# Patient Record
Sex: MALE | Race: OTHER | URBAN - METROPOLITAN AREA
[De-identification: names, ages, dates, MRNs, and addresses within clinical notes are randomized per-mention and may not be internally consistent; named-entity substitution may affect disease eponyms.]

---

## 2016-11-05 LAB
CREATININE, EXTERNAL: 1.13
HBA1C MFR BLD HPLC: 8.3 %
LDL-C, EXTERNAL: 45

## 2017-01-20 ENCOUNTER — OFFICE VISIT (OUTPATIENT)
Dept: ENDOCRINOLOGY | Age: 72
End: 2017-01-20

## 2017-01-20 VITALS
BODY MASS INDEX: 34.37 KG/M2 | HEIGHT: 67 IN | HEART RATE: 81 BPM | WEIGHT: 219 LBS | SYSTOLIC BLOOD PRESSURE: 143 MMHG | DIASTOLIC BLOOD PRESSURE: 78 MMHG | TEMPERATURE: 97.3 F | OXYGEN SATURATION: 96 % | RESPIRATION RATE: 16 BRPM

## 2017-01-20 DIAGNOSIS — E11.65 TYPE 2 DIABETES MELLITUS WITH HYPERGLYCEMIA, WITHOUT LONG-TERM CURRENT USE OF INSULIN (HCC): Primary | ICD-10-CM

## 2017-01-20 DIAGNOSIS — E78.2 MIXED HYPERLIPIDEMIA: ICD-10-CM

## 2017-01-20 DIAGNOSIS — I10 ESSENTIAL HYPERTENSION: ICD-10-CM

## 2017-01-20 RX ORDER — VALSARTAN 160 MG/1
TABLET ORAL 2 TIMES DAILY
COMMUNITY
End: 2017-02-22 | Stop reason: SDUPTHER

## 2017-01-20 RX ORDER — GLIPIZIDE 10 MG/1
10 TABLET ORAL DAILY
COMMUNITY
End: 2017-01-20 | Stop reason: SDUPTHER

## 2017-01-20 RX ORDER — SIMVASTATIN 20 MG/1
TABLET, FILM COATED ORAL
COMMUNITY
End: 2018-08-22 | Stop reason: SDUPTHER

## 2017-01-20 RX ORDER — GLUCOSAMINE SULFATE 1500 MG
400 POWDER IN PACKET (EA) ORAL DAILY
COMMUNITY

## 2017-01-20 RX ORDER — METFORMIN HYDROCHLORIDE 1000 MG/1
1000 TABLET ORAL 2 TIMES DAILY WITH MEALS
COMMUNITY
End: 2017-02-22 | Stop reason: SDUPTHER

## 2017-01-20 RX ORDER — COLCHICINE 0.6 MG/1
0.6 TABLET ORAL DAILY
COMMUNITY
End: 2017-02-22 | Stop reason: SDUPTHER

## 2017-01-20 RX ORDER — ASPIRIN 81 MG/1
81 TABLET ORAL DAILY
COMMUNITY

## 2017-01-20 RX ORDER — GLIPIZIDE 10 MG/1
TABLET ORAL
Qty: 60 TAB | Refills: 11 | Status: SHIPPED | OUTPATIENT
Start: 2017-01-20 | End: 2017-02-22 | Stop reason: SDUPTHER

## 2017-01-20 NOTE — PROGRESS NOTES
[facility]               Sarah Qiu MD        [address] 78 [phone] Fax [phone]               Patient Information  Date:1/21/2017  Name : Ankita Rosado 66 y.o.     YOB: 1945         PCP : Bettye Linn. Amanda Gaines MD         Chief Complaint   Patient presents with    Diabetes       History of Present Illness: Ankita Rosado is a 66 y.o. male here for initial visit of  Type 2 Diabetes Mellitus. Type 2 Diabetes was diagnosed in 2006 . End organ effects of diabetes: none. Cardiovascular risk factors: dyslipidemia, diabetes mellitus   Monitoring frequency:1 /day and readings run 160 - 220  Hypoglycemia: no  Eye exam :  yes  Weight trend: stable  Prior visit with dietician: yes -   Current diet: \"healthy\" diet  in general  Current exercise: no regular exercise    Wt Readings from Last 3 Encounters:   01/20/17 219 lb (99.3 kg)       BP Readings from Last 3 Encounters:   01/20/17 143/78           Past Medical History   Diagnosis Date    Diabetes ([facility] [address]     Gout     HTN (hypertension)     Hyperlipemia     Vitamin D deficiency      Current Outpatient Prescriptions   Medication Sig    simvastatin (ZOCOR) 20 mg tablet Take  by mouth nightly.  cholecalciferol (VITAMIN D3) 1,000 unit cap Take  by mouth daily.  metFORMIN (GLUCOPHAGE) 1,000 mg tablet Take 1,000 mg by mouth two (2) times daily (with meals).  VIT A/VIT C/VIT E/ZINC/COPPER (PRESERVISION AREDS PO) Take  by mouth.  valsartan (DIOVAN) 160 mg tablet Take  by mouth two (2) times a day.  colchicine 0.6 mg tablet Take 0.6 mg by mouth daily.  aspirin delayed-release 81 mg tablet Take  by mouth daily.  glipiZIDE (GLUCOTROL) 10 mg tablet Take 10 mg before breakfast and take 10 mg before dinner. No current facility-administered medications for this visit.       Allergies   Allergen Reactions    Lisinopril Swelling         Review of Systems:  - Constitutional Symptoms: no fevers, no chills, no weight loss  - Eyes: no blurry vision no double vision  - Cardiovascular: no chest pain ,no palpitations  - Respiratory: no cough no shortness of breath  - Gastrointestinal: no dysphagia no  abdominal pain  - Musculoskeletal: no joint pains no  weakness  - Integumentary: no rashes  - Neurological: no numbness, tingling, no  headaches  - Psychiatric: no depression no  anxiety  - Endocrine: no heat or cold intolerance    Physical Examination:   Blood pressure 143/78, pulse 81, temperature 97.3 °F (36.3 °C), temperature source Oral, resp. rate 16, height 5' 7\" (1.702 m), weight 219 lb (99.3 kg), SpO2 96 %. Estimated body mass index is 34.3 kg/(m^2) as calculated from the following:    Height as of this encounter: 5' 7\" (1.702 m). -   Weight as of this encounter: 219 lb (99.3 kg). - General: pleasant, no distress, good eye contact  - HEENT: no pallor, no periorbital edema, EOMI  - Neck: supple, no thyromegaly, no nodules  - Cardiovascular: regular, normal rate, normal S1 and S2, no murmurs  - Respiratory: clear to auscultation bilaterally  - Gastrointestinal: soft, nontender, nondistended,  BS +  - Musculoskeletal: no proximal muscle weakness in upper or lower extremities  - Integumentary: no acanthosis nigricans,no edema,   - Neurological: ,alert and oriented  - Psychiatric: normal mood and affect  - Skin: color, texture, turgor normal.       Data Reviewed:     [] Glucose records reviewed. [] See glucose records for details (to be scanned). [] A1C  [] Reviewed labs    Cr nl , A1C 8     Assessment/Plan:     1. Type 2 diabetes mellitus with hyperglycemia, without long-term current use of insulin ([address]    2. Essential hypertension    3. Mixed hyperlipidemia        1.  Type 2 Diabetes Mellitus with nonephropathy,neuropathy,retinopathy  No results found for: HBA1C, HGBE8, XMU6UOOW, BSU6NJCZ, ICC4YVKL   Discussed importance of diet , exercise and compliance with medications  Increase glipizide to 10 mg BID  Risks of hypoglycemia discussed   Advised to check glucose 2 times daily    Diabetic issues reviewed : glycemic goals , written exchange diet given, low carbohydrate diet, weight control , home glucose monitoring emphasized,  hypoglycemia management and long term diabetic complications discussed. FLU annually ,Pneumovax ,aspirin daily,annual eye exam,microalbumin    2. HTN : Continue current therapy     3. Hyperlipidemia : Continue statin. 4.Obesity:Body mass index is 34.3 kg/(m^2). Discussed about the importance of exercise and carbohydrate portion control. Patient Instructions   Glipizide 10 mg twice daily    Follow-up Disposition:  Return in about 3 months (around 4/20/2017). Thank you for allowing me to participate in the care of this patient.     Reba David MD      Patient verbalized understanding

## 2017-01-20 NOTE — PROGRESS NOTES
Eye exam: 8 months ago  Foot exam: within last year  Diabetic for about 10 years    Wt Readings from Last 3 Encounters:   01/20/17 219 lb (99.3 kg)     Temp Readings from Last 3 Encounters:   01/20/17 97.3 °F (36.3 °C) (Oral)     BP Readings from Last 3 Encounters:   01/20/17 143/78     Pulse Readings from Last 3 Encounters:   01/20/17 81

## 2017-01-20 NOTE — MR AVS SNAPSHOT
Visit Information Date & Time Provider Department Dept. Phone Encounter #  
 1/20/2017 11:00 AM Rubén Arellano MD [facility] [phone] [id number] Follow-up Instructions Return in about 3 months (around 4/20/2017). Upcoming Health Maintenance Date Due DTaP/Tdap/Td series (1 - Tdap) 4/3/1965 FOBT Q 1 YEAR AGE 50-75 4/3/1994 ZOSTER VACCINE AGE 60> 4/3/2004 GLAUCOMA SCREENING Q2Y 4/3/2009 Pneumococcal 65+ Low/Medium Risk (1 of 2 - PCV13) 4/3/2009 MEDICARE YEARLY EXAM 4/3/2009 INFLUENZA AGE 9 TO ADULT 8/1/2016 Allergies as of 1/20/2017  Review Complete On: 1/20/2017 By: Rubén Arellano MD  
  
 Severity Noted Reaction Type Reactions Lisinopril  01/20/2017    Swelling Current Immunizations  Never Reviewed No immunizations on file. Not reviewed this visit Vitals BP Pulse Temp Resp Height(growth percentile) Weight(growth percentile) 143/78 (BP 1 Location: Right arm, BP Patient Position: Sitting) 81 97.3 °F (36.3 °C) (Oral) 16 5' 7\" (1.702 m) 219 lb (99.3 kg) SpO2 BMI Smoking Status 96% 34.3 kg/m2 Former Smoker Vitals History BMI and BSA Data Body Mass Index Body Surface Area  
 34.3 kg/m 2 2.17 m 2 Your Updated Medication List  
  
   
This list is accurate as of: 1/20/17 11:51 AM.  Always use your most recent med list.  
  
  
  
  
 aspirin delayed-release 81 mg tablet Take  by mouth daily. colchicine 0.6 mg tablet Take 0.6 mg by mouth daily. glipiZIDE 10 mg tablet Commonly known as:  Selestine Ahuja Take 10 mg by mouth daily. metFORMIN 1,000 mg tablet Commonly known as:  GLUCOPHAGE Take 1,000 mg by mouth two (2) times daily (with meals). PRESERVISION AREDS PO Take  by mouth. simvastatin 20 mg tablet Commonly known as:  ZOCOR Take  by mouth nightly. valsartan 160 mg tablet Commonly known as:  DIOVAN  
 Take  by mouth two (2) times a day. VITAMIN D3 1,000 unit Cap Generic drug:  cholecalciferol Take  by mouth daily. Follow-up Instructions Return in about 3 months (around 4/20/2017). Patient Instructions Glipizide 10 mg twice daily Introducing [facility] & HEALTH SERVICES! [employer] introduces [employer] patient portal. Now you can access parts of your medical record, email your doctor's office, and request medication refills online. 1. In your internet browser, go to [email or web address] 2. Click on the First Time User? Click Here link in the Sign In box. You will see the New Member Sign Up page. 3. Enter your [employer] Access Code exactly as it appears below. You will not need to use this code after youve completed the sign-up process. If you do not sign up before the expiration date, you must request a new code. · [employer] Access Code: [email or web address] Expires: 4/20/2017 11:51 AM 
 
4. Enter the last four digits of your Social Security Number (xxxx) and Date of Birth (mm/dd/yyyy) as indicated and click Submit. You will be taken to the next sign-up page. 5. Create a D[employer]able ID. This will be your [employer]able login ID and cannot be changed, so think of one that is secure and easy to remember. 6. Create a [employer] password. You can change your password at any time. 7. Enter your Password Reset Question and Answer. This can be used at a later time if you forget your password. 8. Enter your e-mail address. You will receive e-mail notification when new information is available in [address] 9. Click Sign Up. You can now view and download portions of your medical record. 10. Click the Download Summary menu link to download a portable copy of your medical information. If you have questions, please visit the Frequently Asked Questions section of the [employer] website. Remember, [employer]able is NOT to be used for urgent needs. For medical emergencies, dial 911. Now available from your iPhone and Android! Please provide this summary of care documentation to your next provider. Your primary care clinician is listed as Ebenezer Gan. If you have any questions after today's visit, please call [phone].

## 2017-01-21 PROBLEM — I10 ESSENTIAL HYPERTENSION: Status: ACTIVE | Noted: 2017-01-21

## 2017-01-21 PROBLEM — E78.2 MIXED HYPERLIPIDEMIA: Status: ACTIVE | Noted: 2017-01-21

## 2017-01-21 PROBLEM — E11.65 TYPE 2 DIABETES MELLITUS WITH HYPERGLYCEMIA, WITHOUT LONG-TERM CURRENT USE OF INSULIN (HCC): Status: ACTIVE | Noted: 2017-01-21

## 2017-02-22 DIAGNOSIS — E11.65 TYPE 2 DIABETES MELLITUS WITH HYPERGLYCEMIA, WITHOUT LONG-TERM CURRENT USE OF INSULIN (HCC): ICD-10-CM

## 2017-02-22 RX ORDER — VALSARTAN 160 MG/1
160 TABLET ORAL 2 TIMES DAILY
Qty: 180 TAB | Refills: 3 | Status: SHIPPED | OUTPATIENT
Start: 2017-02-22

## 2017-02-22 RX ORDER — COLCHICINE 0.6 MG/1
0.6 TABLET ORAL DAILY
Qty: 90 TAB | Refills: 3 | Status: SHIPPED | OUTPATIENT
Start: 2017-02-22

## 2017-02-22 RX ORDER — GLIPIZIDE 10 MG/1
TABLET ORAL
Qty: 180 TAB | Refills: 3 | Status: SHIPPED | OUTPATIENT
Start: 2017-02-22 | End: 2017-07-25 | Stop reason: SDUPTHER

## 2017-02-22 RX ORDER — METFORMIN HYDROCHLORIDE 1000 MG/1
1000 TABLET ORAL 2 TIMES DAILY WITH MEALS
Qty: 180 TAB | Refills: 3 | Status: SHIPPED | OUTPATIENT
Start: 2017-02-22 | End: 2018-10-16 | Stop reason: SDUPTHER

## 2017-04-20 LAB
ALBUMIN SERPL-MCNC: 4.3 G/DL (ref 3.5–4.8)
ALBUMIN/CREAT UR: 15.8 MG/G CREAT (ref 0–30)
ALBUMIN/GLOB SERPL: 1.4 {RATIO} (ref 1.2–2.2)
ALP SERPL-CCNC: 101 IU/L (ref 39–117)
ALT SERPL-CCNC: 10 IU/L (ref 0–44)
AST SERPL-CCNC: 11 IU/L (ref 0–40)
BILIRUB SERPL-MCNC: 0.5 MG/DL (ref 0–1.2)
BUN SERPL-MCNC: 16 MG/DL (ref 8–27)
BUN/CREAT SERPL: 15 (ref 10–24)
CALCIUM SERPL-MCNC: 9.5 MG/DL (ref 8.6–10.2)
CHLORIDE SERPL-SCNC: 96 MMOL/L (ref 96–106)
CHOLEST SERPL-MCNC: 125 MG/DL (ref 100–199)
CO2 SERPL-SCNC: 21 MMOL/L (ref 18–29)
CREAT SERPL-MCNC: 1.04 MG/DL (ref 0.76–1.27)
CREAT UR-MCNC: 124.4 MG/DL
EST. AVERAGE GLUCOSE BLD GHB EST-MCNC: 197 MG/DL
GLOBULIN SER CALC-MCNC: 3 G/DL (ref 1.5–4.5)
GLUCOSE SERPL-MCNC: 197 MG/DL (ref 65–99)
HBA1C MFR BLD: 8.5 % (ref 4.8–5.6)
HDLC SERPL-MCNC: 60 MG/DL
INTERPRETATION, 910389: NORMAL
LDLC SERPL CALC-MCNC: 45 MG/DL (ref 0–99)
Lab: NORMAL
Lab: NORMAL
MICROALBUMIN UR-MCNC: 19.6 UG/ML
POTASSIUM SERPL-SCNC: 4.6 MMOL/L (ref 3.5–5.2)
PROT SERPL-MCNC: 7.3 G/DL (ref 6–8.5)
SODIUM SERPL-SCNC: 138 MMOL/L (ref 134–144)
TRIGL SERPL-MCNC: 102 MG/DL (ref 0–149)
VLDLC SERPL CALC-MCNC: 20 MG/DL (ref 5–40)

## 2017-04-24 ENCOUNTER — OFFICE VISIT (OUTPATIENT)
Dept: ENDOCRINOLOGY | Age: 72
End: 2017-04-24

## 2017-04-24 VITALS
HEART RATE: 76 BPM | TEMPERATURE: 97.2 F | SYSTOLIC BLOOD PRESSURE: 136 MMHG | HEIGHT: 67 IN | WEIGHT: 216 LBS | BODY MASS INDEX: 33.9 KG/M2 | DIASTOLIC BLOOD PRESSURE: 67 MMHG | RESPIRATION RATE: 18 BRPM

## 2017-04-24 DIAGNOSIS — E11.65 TYPE 2 DIABETES MELLITUS WITH HYPERGLYCEMIA, WITHOUT LONG-TERM CURRENT USE OF INSULIN (HCC): Primary | ICD-10-CM

## 2017-04-24 DIAGNOSIS — I10 ESSENTIAL HYPERTENSION: ICD-10-CM

## 2017-04-24 DIAGNOSIS — E78.2 MIXED HYPERLIPIDEMIA: ICD-10-CM

## 2017-04-24 NOTE — PROGRESS NOTES
Desiree York is a 67 y.o. male here for   Chief Complaint   Patient presents with    Diabetes       Functional glucose monitor and record keeping system? - yes  Eye exam within last year? - yes  Foot exam within last year? - yes    Lab Results   Component Value Date/Time    Hemoglobin A1c 8.5 04/19/2017 09:08 AM    Hemoglobin A1c, External 8.3 11/05/2016 12:57 PM       Wt Readings from Last 3 Encounters:   01/20/17 219 lb (99.3 kg)     Temp Readings from Last 3 Encounters:   01/20/17 97.3 °F (36.3 °C) (Oral)     BP Readings from Last 3 Encounters:   01/20/17 143/78     Pulse Readings from Last 3 Encounters:   01/20/17 81

## 2017-04-24 NOTE — MR AVS SNAPSHOT
Visit Information Date & Time Provider Department Dept. Phone Encounter #  
 4/24/2017 11:45 AM Guerda Rodriguez MD [facility] [phone] [id number] Follow-up Instructions Return in about 3 months (around 7/24/2017). Upcoming Health Maintenance Date Due  
 FOOT EXAM Q1 4/3/1954 EYE EXAM RETINAL OR DILATED Q1 4/3/1954 DTaP/Tdap/Td series (1 - Tdap) 4/3/1965 FOBT Q 1 YEAR AGE 50-75 4/3/1994 ZOSTER VACCINE AGE 60> 4/3/2004 GLAUCOMA SCREENING Q2Y 4/3/2009 Pneumococcal 65+ Low/Medium Risk (1 of 2 - PCV13) 4/3/2009 MEDICARE YEARLY EXAM 4/3/2009 INFLUENZA AGE 9 TO ADULT 8/1/2016 HEMOGLOBIN A1C Q6M 10/19/2017 MICROALBUMIN Q1 4/19/2018 LIPID PANEL Q1 4/19/2018 Allergies as of 4/24/2017  Review Complete On: 4/24/2017 By: Guerda Rodriguez MD  
  
 Severity Noted Reaction Type Reactions Lisinopril  01/20/2017    Swelling Current Immunizations  Never Reviewed No immunizations on file. Not reviewed this visit You Were Diagnosed With   
  
 Codes Comments Type 2 diabetes mellitus with hyperglycemia, without long-term current use of insulin (HCC)    -  Primary ICD-10-CM: E11.65 ICD-9-CM: 250.00, 790.29 Mixed hyperlipidemia     ICD-10-CM: E78.2 ICD-9-CM: 272.2 Essential hypertension     ICD-10-CM: I10 
ICD-9-CM: 401.9 Vitals BP Pulse Temp Resp Height(growth percentile) Weight(growth percentile) 136/67 (BP 1 Location: Left arm, BP Patient Position: Sitting) 76 97.2 °F (36.2 °C) (Oral) 18 5' 7\" (1.702 m) 216 lb (98 kg) BMI Smoking Status 33.83 kg/m2 Former Smoker Vitals History BMI and BSA Data Body Mass Index Body Surface Area  
 33.83 kg/m 2 2.15 m 2 Preferred Pharmacy Pharmacy Name Phone [facility] [address] [place] [address] [phone] Your Updated Medication List  
  
   
 This list is accurate as of: 4/24/17  1:02 PM.  Always use your most recent med list.  
  
  
  
  
 aspirin delayed-release 81 mg tablet Take  by mouth daily. colchicine 0.6 mg tablet Take 1 Tab by mouth daily. glipiZIDE 10 mg tablet Commonly known as:  José Fujita Take 10 mg before breakfast and take 10 mg before dinner. metFORMIN 1,000 mg tablet Commonly known as:  GLUCOPHAGE Take 1 Tab by mouth two (2) times daily (with meals). OTHER Take 2 mg by mouth daily. Super Beta Prostate PRESERVISION AREDS PO Take  by mouth. simvastatin 20 mg tablet Commonly known as:  ZOCOR Take  by mouth nightly. valsartan 160 mg tablet Commonly known as:  DIOVAN Take 1 Tab by mouth two (2) times a day. VITAMIN D3 1,000 unit Cap Generic drug:  cholecalciferol Take  by mouth daily. Follow-up Instructions Return in about 3 months (around 7/24/2017). Introducing [facility] & HEALTH SERVICES! Estela Booth introduces [employer] patient portal. Now you can access parts of your medical record, email your doctor's office, and request medication refills online. 1. In your internet browser, go to [email or web address] 2. Click on the First Time User? Click Here link in the Sign In box. You will see the New Member Sign Up page. 3. Enter your [employer] Access Code exactly as it appears below. You will not need to use this code after youve completed the sign-up process. If you do not sign up before the expiration date, you must request a new code. · [employer] Access Code: [email or web address] Expires: 7/23/2017  1:02 PM 
 
4. Enter the last four digits of your Social Security Number (xxxx) and Date of Birth (mm/dd/yyyy) as indicated and click Submit. You will be taken to the next sign-up page. 5. Create a [employer] ID. This will be your [employer] login ID and cannot be changed, so think of one that is secure and easy to remember. 6. Create a [employer] password. You can change your password at any time. 7. Enter your Password Reset Question and Answer. This can be used at a later time if you forget your password. 8. Enter your e-mail address. You will receive e-mail notification when new information is available in [address] 9. Click Sign Up. You can now view and download portions of your medical record. 10. Click the Download Summary menu link to download a portable copy of your medical information. If you have questions, please visit the Frequently Asked Questions section of the [employer] website. Remember, [employer] is NOT to be used for urgent needs. For medical emergencies, dial 911. Now available from your iPhone and Android! Please provide this summary of care documentation to your next provider. Your primary care clinician is listed as Perry Hubbard. If you have any questions after today's visit, please call [phone].

## 2017-04-24 NOTE — PROGRESS NOTES
[facility]               Ash Rodriguez MD        [address] [address] [phone] Fax [phone]               Patient Information  Date:2017  Name : Lindy Seals 67 y.o.     YOB: 1945         PCP : Paulie Wheatley. Aries Puckett MD         Chief Complaint   Patient presents with    Diabetes       History of Present Illness: Lindy Seals is a 67 y.o. male here for fu  of  Type 2 Diabetes Mellitus. Type 2 Diabetes was diagnosed in  . End organ effects of diabetes: none. Cardiovascular risk factors: dyslipidemia, diabetes mellitus   Monitoring frequency:1 /day  Checking glucose at home  Has not noticed significant hypoglycemia     Compliant with medications   BG is higher   Eating late and on high carb diet           Wt Readings from Last 3 Encounters:   17 216 lb (98 kg)   17 219 lb (99.3 kg)       BP Readings from Last 3 Encounters:   17 136/67   17 143/78           Past Medical History:   Diagnosis Date    Diabetes ([facility] [address]     Gout     HTN (hypertension)     Hyperlipemia     Vitamin D deficiency      Current Outpatient Prescriptions   Medication Sig    OTHER Take 2 mg by mouth daily. Super Beta Prostate    metFORMIN (GLUCOPHAGE) 1,000 mg tablet Take 1 Tab by mouth two (2) times daily (with meals).  valsartan (DIOVAN) 160 mg tablet Take 1 Tab by mouth two (2) times a day.  colchicine 0.6 mg tablet Take 1 Tab by mouth daily.  glipiZIDE (GLUCOTROL) 10 mg tablet Take 10 mg before breakfast and take 10 mg before dinner.  simvastatin (ZOCOR) 20 mg tablet Take  by mouth nightly.  cholecalciferol (VITAMIN D3) 1,000 unit cap Take  by mouth daily.  VIT A/VIT C/VIT E/ZINC/COPPER (PRESERVISION AREDS PO) Take  by mouth.  aspirin delayed-release 81 mg tablet Take  by mouth daily. No current facility-administered medications for this visit.       Allergies   Allergen Reactions    Lisinopril Swelling         Review of Systems:  - Constitutional Symptoms: no fevers, no chills, no weight loss  - Eyes: no blurry vision no double vision  - Cardiovascular: no chest pain ,no palpitations  - Respiratory: no cough no shortness of breath  - Gastrointestinal: no dysphagia no  abdominal pain  - Musculoskeletal: no joint pains no  weakness  - Integumentary: no rashes  - Neurological: no numbness, tingling, no  headaches  - Psychiatric: no depression no  anxiety  - Endocrine: no heat or cold intolerance    Physical Examination:   Blood pressure 136/67, pulse 76, temperature 97.2 °F (36.2 °C), temperature source Oral, resp. rate 18, height 5' 7\" (1.702 m), weight 216 lb (98 kg). Estimated body mass index is 33.83 kg/(m^2) as calculated from the following:    Height as of this encounter: 5' 7\" (1.702 m). -   Weight as of this encounter: 216 lb (98 kg). - General: pleasant, no distress, good eye contact  - HEENT: no pallor, no periorbital edema, EOMI  - Neck: supple, no thyromegaly, no nodules  - Cardiovascular: regular, normal rate, normal S1 and S2, no murmurs  - Respiratory: clear to auscultation bilaterally  - Gastrointestinal: soft, nontender, nondistended,  BS +  - Musculoskeletal: no proximal muscle weakness in upper or lower extremities  - Integumentary: no acanthosis nigricans,no edema,   - Neurological: ,alert and oriented  - Psychiatric: normal mood and affect  - Skin: color, texture, turgor normal.       Data Reviewed:     [] Glucose records reviewed. [] See glucose records for details (to be scanned). [] A1C  [] Reviewed labs    Cr nl , A1C 8     Assessment/Plan:     1. Type 2 diabetes mellitus with hyperglycemia, without long-term current use of insulin ([address]    2. Mixed hyperlipidemia    3. Essential hypertension        1.  Type 2 Diabetes Mellitus with nonephropathy,neuropathy,retinopathy  Lab Results   Component Value Date/Time    Hemoglobin A1c 8.5 04/19/2017 09:08 AM    Hemoglobin A1c, External 8.3 11/05/2016 12:57 PM      glipizide to 10 mg BID, Metformin   Januvia  Risks of hypoglycemia discussed   Advised to check glucose 2 times daily    Discussed the importance of checking home glucose regularly and taking all of their scheduled medications in order to have the best possible outcome. Reviewed the complications and importance of diet. 2.  HTN : Continue current therapy     3. Hyperlipidemia : Continue statin. 4.Obesity:Body mass index is 33.83 kg/(m^2). Discussed about the importance of exercise and carbohydrate portion control. There are no Patient Instructions on file for this visit. Follow-up Disposition: Not on File    Thank you for allowing me to participate in the care of this patient.     Reilly Serna MD      Patient verbalized understanding

## 2017-07-25 DIAGNOSIS — E11.65 TYPE 2 DIABETES MELLITUS WITH HYPERGLYCEMIA, WITHOUT LONG-TERM CURRENT USE OF INSULIN (HCC): ICD-10-CM

## 2017-07-25 RX ORDER — GLIPIZIDE 10 MG/1
TABLET ORAL
Qty: 180 TAB | Refills: 3 | Status: SHIPPED | OUTPATIENT
Start: 2017-07-25 | End: 2018-10-08 | Stop reason: SDUPTHER

## 2017-07-26 ENCOUNTER — OFFICE VISIT (OUTPATIENT)
Dept: ENDOCRINOLOGY | Age: 72
End: 2017-07-26

## 2017-07-26 VITALS
RESPIRATION RATE: 16 BRPM | DIASTOLIC BLOOD PRESSURE: 63 MMHG | HEART RATE: 82 BPM | BODY MASS INDEX: 32.3 KG/M2 | HEIGHT: 67 IN | SYSTOLIC BLOOD PRESSURE: 131 MMHG | OXYGEN SATURATION: 96 % | TEMPERATURE: 97.5 F | WEIGHT: 205.8 LBS

## 2017-07-26 DIAGNOSIS — I10 ESSENTIAL HYPERTENSION: ICD-10-CM

## 2017-07-26 DIAGNOSIS — E11.65 TYPE 2 DIABETES MELLITUS WITH HYPERGLYCEMIA, WITHOUT LONG-TERM CURRENT USE OF INSULIN (HCC): Primary | ICD-10-CM

## 2017-07-26 DIAGNOSIS — E78.2 MIXED HYPERLIPIDEMIA: ICD-10-CM

## 2017-07-26 LAB
ALBUMIN SERPL-MCNC: 4.4 G/DL (ref 3.5–4.8)
ALBUMIN/CREAT UR: 17.3 MG/G CREAT (ref 0–30)
ALBUMIN/GLOB SERPL: 1.6 {RATIO} (ref 1.2–2.2)
ALP SERPL-CCNC: 87 IU/L (ref 39–117)
ALT SERPL-CCNC: 9 IU/L (ref 0–44)
AST SERPL-CCNC: 12 IU/L (ref 0–40)
BILIRUB SERPL-MCNC: 0.2 MG/DL (ref 0–1.2)
BUN SERPL-MCNC: 22 MG/DL (ref 8–27)
BUN/CREAT SERPL: 19 (ref 10–24)
CALCIUM SERPL-MCNC: 9.4 MG/DL (ref 8.6–10.2)
CHLORIDE SERPL-SCNC: 98 MMOL/L (ref 96–106)
CHOLEST SERPL-MCNC: 123 MG/DL (ref 100–199)
CO2 SERPL-SCNC: 23 MMOL/L (ref 18–29)
CREAT SERPL-MCNC: 1.14 MG/DL (ref 0.76–1.27)
CREAT UR-MCNC: 256.3 MG/DL
EST. AVERAGE GLUCOSE BLD GHB EST-MCNC: 171 MG/DL
GLOBULIN SER CALC-MCNC: 2.8 G/DL (ref 1.5–4.5)
GLUCOSE SERPL-MCNC: 148 MG/DL (ref 65–99)
HBA1C MFR BLD: 7.6 % (ref 4.8–5.6)
HDLC SERPL-MCNC: 55 MG/DL
INTERPRETATION, 910389: NORMAL
LDLC SERPL CALC-MCNC: 9 MG/DL (ref 0–99)
Lab: NORMAL
MICROALBUMIN UR-MCNC: 44.4 UG/ML
POTASSIUM SERPL-SCNC: 4.6 MMOL/L (ref 3.5–5.2)
PROT SERPL-MCNC: 7.2 G/DL (ref 6–8.5)
SODIUM SERPL-SCNC: 139 MMOL/L (ref 134–144)
TRIGL SERPL-MCNC: 293 MG/DL (ref 0–149)
VLDLC SERPL CALC-MCNC: 59 MG/DL (ref 5–40)

## 2017-07-26 NOTE — MR AVS SNAPSHOT
Visit Information Date & Time Provider Department Dept. Phone Encounter #  
 7/26/2017  1:30 PM Marcus Lopez MD [facility] [phone] [id number] Follow-up Instructions Return in about 6 months (around 1/26/2018). Upcoming Health Maintenance Date Due  
 FOOT EXAM Q1 4/3/1954 EYE EXAM RETINAL OR DILATED Q1 4/3/1954 DTaP/Tdap/Td series (1 - Tdap) 4/3/1965 FOBT Q 1 YEAR AGE 50-75 4/3/1994 ZOSTER VACCINE AGE 60> 2/3/2004 GLAUCOMA SCREENING Q2Y 4/3/2009 Pneumococcal 65+ Low/Medium Risk (1 of 2 - PCV13) 4/3/2009 MEDICARE YEARLY EXAM 4/3/2009 INFLUENZA AGE 9 TO ADULT 8/1/2017 HEMOGLOBIN A1C Q6M 10/19/2017 MICROALBUMIN Q1 4/19/2018 LIPID PANEL Q1 4/19/2018 Allergies as of 7/26/2017  Review Complete On: 7/26/2017 By: Marcus Lopez MD  
  
 Severity Noted Reaction Type Reactions Lisinopril  01/20/2017    Swelling Current Immunizations  Never Reviewed No immunizations on file. Not reviewed this visit You Were Diagnosed With   
  
 Codes Comments Type 2 diabetes mellitus with hyperglycemia, without long-term current use of insulin (HCC)    -  Primary ICD-10-CM: E11.65 ICD-9-CM: 250.00, 790.29 Mixed hyperlipidemia     ICD-10-CM: E78.2 ICD-9-CM: 272.2 Essential hypertension     ICD-10-CM: I10 
ICD-9-CM: 401.9 Vitals BP Pulse Temp Resp Height(growth percentile) Weight(growth percentile) 131/63 (BP 1 Location: Left arm, BP Patient Position: Sitting) 82 97.5 °F (36.4 °C) (Oral) 16 5' 7\" (1.702 m) 205 lb 12.8 oz (93.4 kg) SpO2 BMI Smoking Status 96% 32.23 kg/m2 Former Smoker Vitals History BMI and BSA Data Body Mass Index Body Surface Area  
 32.23 kg/m 2 2.1 m 2 Preferred Pharmacy Pharmacy Name Phone [address] [phone] Your Updated Medication List  
  
   
 This list is accurate as of: 7/26/17  2:00 PM.  Always use your most recent med list.  
  
  
  
  
 aspirin delayed-release 81 mg tablet Take  by mouth daily. colchicine 0.6 mg tablet Take 1 Tab by mouth daily. glipiZIDE 10 mg tablet Commonly known as:  Maxi Erps Take 10 mg before breakfast and take 10 mg before dinner. metFORMIN 1,000 mg tablet Commonly known as:  GLUCOPHAGE Take 1 Tab by mouth two (2) times daily (with meals). OTHER Take 2 mg by mouth daily. Super Beta Prostate PRESERVISION AREDS PO Take  by mouth. simvastatin 20 mg tablet Commonly known as:  ZOCOR Take  by mouth nightly. SITagliptin 100 mg tablet Commonly known as:  Ryann Kendrick Take 1 Tab by mouth every morning. valsartan 160 mg tablet Commonly known as:  DIOVAN Take 1 Tab by mouth two (2) times a day. VITAMIN D3 1,000 unit Cap Generic drug:  cholecalciferol Take  by mouth daily. Follow-up Instructions Return in about 6 months (around 1/26/2018). Introducing \[facility]\"" & HEALTH SERVICES! [facility] introduces [employer] patient portal. Now you can access parts of your medical record, email your doctor's office, and request medication refills online. 1. In your internet browser, go to [email or web address] 2. Click on the First Time User? Click Here link in the Sign In box. You will see the New Member Sign Up page. 3. Enter your [employer] Access Code exactly as it appears below. You will not need to use this code after youve completed the sign-up process. If you do not sign up before the expiration date, you must request a new code. · [employer] Access Code: Kassy Askew Expires: 10/24/2017  2:00 PM 
 
4. Enter the last four digits of your Social Security Number (xxxx) and Date of Birth (mm/dd/yyyy) as indicated and click Submit. You will be taken to the next sign-up page. 5. Create a [employer] ID. This will be your [employer] login ID and cannot be changed, so think of one that is secure and easy to remember. 6. Create a [employer] password. You can change your password at any time. 7. Enter your Password Reset Question and Answer. This can be used at a later time if you forget your password. 8. Enter your e-mail address. You will receive e-mail notification when new information is available in [address] 9. Click Sign Up. You can now view and download portions of your medical record. 10. Click the Download Summary menu link to download a portable copy of your medical information. If you have questions, please visit the Frequently Asked Questions section of the [employer] website. Remember, [employer] is NOT to be used for urgent needs. For medical emergencies, dial 911. Now available from your iPhone and Android! Please provide this summary of care documentation to your next provider. Your primary care clinician is listed as Annetta Painter. If you have any questions after today's visit, please call [phone].

## 2017-07-26 NOTE — PROGRESS NOTES
[facility]               Bailey Valencia MD        [address] [address] SD:[phone] Fax [phone]               Patient Information  Date:7/27/2017  Name : Acacia Fernandez 67 y.o.     YOB: 1945         PCP : Merna Mejia MD         Chief Complaint   Patient presents with    Diabetes       History of Present Illness: Acacia Fernandez is a 67 y.o. male here for fu  of  Type 2 Diabetes Mellitus. Type 2 Diabetes was diagnosed in 2006 . End organ effects of diabetes: none. Cardiovascular risk factors: dyslipidemia, diabetes mellitus   Monitoring frequency:1 /day  Checking glucose at home  Has not noticed significant hypoglycemia       Lost weight - 9 lbs     Doing well     Compliant with medications          Wt Readings from Last 3 Encounters:   07/26/17 205 lb 12.8 oz (93.4 kg)   04/24/17 216 lb (98 kg)   01/20/17 219 lb (99.3 kg)       BP Readings from Last 3 Encounters:   07/26/17 131/63   04/24/17 136/67   01/20/17 143/78           Past Medical History:   Diagnosis Date    Diabetes ([facility] [address] [facility]     Gout     HTN (hypertension)     Hyperlipemia     Vitamin D deficiency      Current Outpatient Prescriptions   Medication Sig    glipiZIDE (GLUCOTROL) 10 mg tablet Take 10 mg before breakfast and take 10 mg before dinner.  OTHER Take 2 mg by mouth daily. Super Beta Prostate    SITagliptin (JANUVIA) 100 mg tablet Take 1 Tab by mouth every morning.  metFORMIN (GLUCOPHAGE) 1,000 mg tablet Take 1 Tab by mouth two (2) times daily (with meals).  valsartan (DIOVAN) 160 mg tablet Take 1 Tab by mouth two (2) times a day.  colchicine 0.6 mg tablet Take 1 Tab by mouth daily.  simvastatin (ZOCOR) 20 mg tablet Take  by mouth nightly.  cholecalciferol (VITAMIN D3) 1,000 unit cap Take  by mouth daily.  VIT A/VIT C/VIT E/ZINC/COPPER (PRESERVISION AREDS PO) Take  by mouth.     aspirin delayed-release 81 mg tablet Take  by mouth daily. No current facility-administered medications for this visit. Allergies   Allergen Reactions    Lisinopril Swelling         Review of Systems:  - Constitutional Symptoms: no fevers, no chills, no weight loss  - Eyes: no blurry vision no double vision  - Cardiovascular: no chest pain ,no palpitations  - Respiratory: no cough no shortness of breath  - Gastrointestinal: no dysphagia no  abdominal pain  - Musculoskeletal: no joint pains no  weakness  - Integumentary: no rashes  - Neurological: no numbness, tingling, no  headaches  - Psychiatric: no depression no  anxiety  - Endocrine: no heat or cold intolerance    Physical Examination:   Blood pressure 131/63, pulse 82, temperature 97.5 °F (36.4 °C), temperature source Oral, resp. rate 16, height 5' 7\" (1.702 m), weight 205 lb 12.8 oz (93.4 kg), SpO2 96 %. Estimated body mass index is 32.23 kg/(m^2) as calculated from the following:    Height as of this encounter: 5' 7\" (1.702 m). -   Weight as of this encounter: 205 lb 12.8 oz (93.4 kg). - General: pleasant, no distress, good eye contact  - HEENT: no pallor, no periorbital edema, EOMI  - Neck: supple, no thyromegaly, no nodules  - Cardiovascular: regular, normal rate, normal S1 and S2, no murmurs  - Respiratory: clear to auscultation bilaterally  - Gastrointestinal: soft, nontender, nondistended,  BS +  - Musculoskeletal: no proximal muscle weakness in upper or lower extremities  - Integumentary: no acanthosis nigricans,no edema,   - Neurological: ,alert and oriented  - Psychiatric: normal mood and affect  - Skin: color, texture, turgor normal.       Data Reviewed:     [] Glucose records reviewed. [] See glucose records for details (to be scanned). [] A1C  [] Reviewed labs    Cr nl , A1C 8     Assessment/Plan:     1. Type 2 diabetes mellitus with hyperglycemia, without long-term current use of insulin ([address]    2. Mixed hyperlipidemia    3. Essential hypertension        1.  Type 2 Diabetes Mellitus with nonephropathy,neuropathy,retinopathy  Lab Results   Component Value Date/Time    Hemoglobin A1c 7.6 07/25/2017 01:44 PM    Hemoglobin A1c, External 8.3 11/05/2016 12:57 PM      glipizide to 10 mg BID, Metformin   Januvia  Risks of hypoglycemia discussed   Advised to check glucose 2 times daily    Discussed the importance of checking home glucose regularly and taking all of their scheduled medications in order to have the best possible outcome. Reviewed the complications and importance of diet. 2.  HTN : Continue current therapy     3. Hyperlipidemia : Continue statin. 4.Obesity:Body mass index is 32.23 kg/(m^2). Discussed about the importance of exercise and carbohydrate portion control. There are no Patient Instructions on file for this visit. Follow-up Disposition:  Return in about 6 months (around 1/26/2018). Thank you for allowing me to participate in the care of this patient.     Jeanine Blizzard, MD      Patient verbalized understanding

## 2017-10-03 LAB — CREATININE, EXTERNAL: 1.06

## 2018-01-30 ENCOUNTER — OFFICE VISIT (OUTPATIENT)
Dept: ENDOCRINOLOGY | Age: 73
End: 2018-01-30

## 2018-01-30 VITALS
WEIGHT: 213.2 LBS | SYSTOLIC BLOOD PRESSURE: 147 MMHG | RESPIRATION RATE: 16 BRPM | BODY MASS INDEX: 33.46 KG/M2 | OXYGEN SATURATION: 97 % | DIASTOLIC BLOOD PRESSURE: 69 MMHG | HEIGHT: 67 IN | TEMPERATURE: 96.9 F | HEART RATE: 71 BPM

## 2018-01-30 DIAGNOSIS — E78.2 MIXED HYPERLIPIDEMIA: ICD-10-CM

## 2018-01-30 DIAGNOSIS — I10 ESSENTIAL HYPERTENSION: ICD-10-CM

## 2018-01-30 DIAGNOSIS — E11.65 TYPE 2 DIABETES MELLITUS WITH HYPERGLYCEMIA, WITHOUT LONG-TERM CURRENT USE OF INSULIN (HCC): Primary | ICD-10-CM

## 2018-01-30 LAB
GLUCOSE POC: 217 MG/DL
HBA1C MFR BLD HPLC: 8.5 %

## 2018-01-30 NOTE — PROGRESS NOTES
Laura Farmer is a 67 y.o. male here for   Chief Complaint   Patient presents with    Diabetes       Functional glucose monitor and record keeping system? - yes  Eye exam within last year? - [address], [place]     1. Have you been to the ER, urgent care clinic since your last visit? Hospitalized since your last visit? -no    2. Have you seen or consulted any other health care providers outside of the [address] [place] [address] since your last visit? Include any pap smears or colon screening. -PCP      Lab Results   Component Value Date/Time    Hemoglobin A1c 7.6 07/25/2017 01:44 PM    Hemoglobin A1c, External 8.3 11/05/2016 12:57 PM       Wt Readings from Last 3 Encounters:   07/26/17 205 lb 12.8 oz (93.4 kg)   04/24/17 216 lb (98 kg)   01/20/17 219 lb (99.3 kg)     Temp Readings from Last 3 Encounters:   07/26/17 97.5 °F (36.4 °C) (Oral)   04/24/17 97.2 °F (36.2 °C) (Oral)   01/20/17 97.3 °F (36.3 °C) (Oral)     BP Readings from Last 3 Encounters:   07/26/17 131/63   04/24/17 136/67   01/20/17 143/78     Pulse Readings from Last 3 Encounters:   07/26/17 82   04/24/17 76   01/20/17 81

## 2018-01-30 NOTE — MR AVS SNAPSHOT
[address] [place] [address] E [facility] [address] 
[phone] Patient: Moon Luz MRN: [id number] SDL:[birth year] Visit Information Date & Time Provider Department Dept. Phone Encounter #  
 1/30/2018  1:30 PM Liz Orr MD [facility] [phone] [id number] Follow-up Instructions Return in about 5 months (around 6/30/2018). Upcoming Health Maintenance Date Due  
 FOOT EXAM Q1 4/3/1954 EYE EXAM RETINAL OR DILATED Q1 4/3/1954 DTaP/Tdap/Td series (1 - Tdap) 4/3/1965 FOBT Q 1 YEAR AGE 50-75 4/3/1994 ZOSTER VACCINE AGE 60> 2/3/2004 GLAUCOMA SCREENING Q2Y 4/3/2009 Pneumococcal 65+ Low/Medium Risk (1 of 2 - PCV13) 4/3/2009 MEDICARE YEARLY EXAM 4/3/2009 Influenza Age 5 to Adult 8/1/2017 HEMOGLOBIN A1C Q6M 1/25/2018 MICROALBUMIN Q1 7/25/2018 LIPID PANEL Q1 7/25/2018 Allergies as of 1/30/2018  Review Complete On: 1/30/2018 By: Liz Orr MD  
  
 Severity Noted Reaction Type Reactions Lisinopril  01/20/2017    Swelling Current Immunizations  Never Reviewed No immunizations on file. Not reviewed this visit You Were Diagnosed With   
  
 Codes Comments Type 2 diabetes mellitus with hyperglycemia, without long-term current use of insulin (HCC)    -  Primary ICD-10-CM: E11.65 ICD-9-CM: 250.00, 790.29 Mixed hyperlipidemia     ICD-10-CM: E78.2 ICD-9-CM: 272.2 Essential hypertension     ICD-10-CM: I10 
ICD-9-CM: 401.9 Vitals BP Pulse Temp Resp Height(growth percentile) Weight(growth percentile) 147/69 (BP 1 Location: Left arm, BP Patient Position: Sitting) 71 96.9 °F (36.1 °C) (Oral) 16 5' 7\" (1.702 m) 213 lb 3.2 oz (96.7 kg) SpO2 BMI Smoking Status 97% 33.39 kg/m2 Former Smoker Vitals History BMI and BSA Data Body Mass Index Body Surface Area  
 33.39 kg/m 2 2.14 m 2 [facility] [address] [address] [phone] Your Updated Medication List  
  
   
This list is accurate as of: 1/30/18  2:15 PM.  Always use your most recent med list.  
  
  
  
  
 aspirin delayed-release 81 mg tablet Take 81 mg by mouth daily. colchicine 0.6 mg tablet Take 1 Tab by mouth daily. glipiZIDE 10 mg tablet Commonly known as:  Lawrence Formosa Take 10 mg before breakfast and take 10 mg before dinner. metFORMIN 1,000 mg tablet Commonly known as:  GLUCOPHAGE Take 1 Tab by mouth two (2) times daily (with meals). OTHER Take 2 mg by mouth daily. Super Beta Prostate PRESERVISION AREDS PO Take  by mouth. simvastatin 20 mg tablet Commonly known as:  ZOCOR Take  by mouth nightly. SITagliptin 100 mg tablet Commonly known as:  Ceil [place] Take 1 Tab by mouth every morning. valsartan 160 mg tablet Commonly known as:  DIOVAN Take 1 Tab by mouth two (2) times a day. VITAMIN D3 1,000 unit Cap Generic drug:  cholecalciferol Take 400 Units by mouth daily. We Performed the Following AMB POC GLUCOSE, QUANTITATIVE, BLOOD [47520 CPT(R)] AMB POC HEMOGLOBIN A1C [79754 CPT(R)] MICROALBUMIN, UR, RAND W/ MICROALBUMIN/CREA RATIO J2225623 CPT(R)] Follow-up Instructions Return in about 5 months (around 6/30/2018). Introducing [facility]! Jeannine Cross introduces [employer] patient portal. Now you can access parts of your medical record, email your doctor's office, and request medication refills online. 1. In your internet browser, go to [email or web address] 2. Click on the First Time User? Click Here link in the Sign In box. You will see the New Member Sign Up page. 3. Enter your [employer] Access Code exactly as it appears below. You will not need to use this code after youve completed the sign-up process.  If you do not sign up before the expiration date, you must request a new code. · [employer] Access Code: [email or web address] Expires: 4/30/2018  2:15 PM 
 
4. Enter the last four digits of your Social Security Number (xxxx) and Date of Birth (mm/dd/yyyy) as indicated and click Submit. You will be taken to the next sign-up page. 5. Create a [employer]urb ID. This will be your [employer] login ID and cannot be changed, so think of one that is secure and easy to remember. 6. Create a [employer] password. You can change your password at any time. 7. Enter your Password Reset Question and Answer. This can be used at a later time if you forget your password. 8. Enter your e-mail address. You will receive e-mail notification when new information is available in [address] 9. Click Sign Up. You can now view and download portions of your medical record. 10. Click the Download Summary menu link to download a portable copy of your medical information. If you have questions, please visit the Frequently Asked Questions section of the [employer] website. Remember, [employer] is NOT to be used for urgent needs. For medical emergencies, dial 911. Now available from your iPhone and Android! Please provide this summary of care documentation to your next provider. Your primary care clinician is listed as Scar Hong. If you have any questions after today's visit, please call [phone].

## 2018-01-30 NOTE — PROGRESS NOTES
[facility]               Kalpana Choi MD        [address] [address] HP:[phone] Fax [phone]               Patient Information  Date:1/30/2018  Name : Carloz Kohli 67 y.o.     YOB: 1945         PCP : Serafin Camp MD         Chief Complaint   Patient presents with    Diabetes       History of Present Illness: Carloz Kohli is a 67 y.o. male here for fu  of  Type 2 Diabetes Mellitus. Type 2 Diabetes was diagnosed in 2006 . End organ effects of diabetes: none. Cardiovascular risk factors: dyslipidemia, diabetes mellitus   Monitoring frequency:1 /day  Checking glucose at home  Has not noticed significant hypoglycemia        Gained weight during holidays '  Not on JAnuvia   BG is high       Compliant with medications          Wt Readings from Last 3 Encounters:   01/30/18 213 lb 3.2 oz (96.7 kg)   07/26/17 205 lb 12.8 oz (93.4 kg)   04/24/17 216 lb (98 kg)       BP Readings from Last 3 Encounters:   01/30/18 147/69   07/26/17 131/63   04/24/17 136/67           Past Medical History:   Diagnosis Date    Diabetes ([facility] [address]     Gout     HTN (hypertension)     Hyperlipemia     Vitamin D deficiency      Current Outpatient Prescriptions   Medication Sig    glipiZIDE (GLUCOTROL) 10 mg tablet Take 10 mg before breakfast and take 10 mg before dinner.  metFORMIN (GLUCOPHAGE) 1,000 mg tablet Take 1 Tab by mouth two (2) times daily (with meals).  valsartan (DIOVAN) 160 mg tablet Take 1 Tab by mouth two (2) times a day.  colchicine 0.6 mg tablet Take 1 Tab by mouth daily. (Patient taking differently: Take 0.6 mg by mouth as needed.)    simvastatin (ZOCOR) 20 mg tablet Take  by mouth nightly.  cholecalciferol (VITAMIN D3) 1,000 unit cap Take 400 Units by mouth daily.  VIT A/VIT C/VIT E/ZINC/COPPER (PRESERVISION AREDS PO) Take  by mouth.  aspirin delayed-release 81 mg tablet Take 81 mg by mouth daily.     OTHER Take 2 mg by mouth daily. Super Beta Prostate    SITagliptin (JANUVIA) 100 mg tablet Take 1 Tab by mouth every morning. No current facility-administered medications for this visit. Allergies   Allergen Reactions    Lisinopril Swelling         Review of Systems:  - Constitutional Symptoms: no fevers, no chills, no weight loss  - Eyes: no blurry vision no double vision  - Cardiovascular: no chest pain ,no palpitations  - Respiratory: no cough no shortness of breath  - Gastrointestinal: no dysphagia no  abdominal pain  - Musculoskeletal: no joint pains no  weakness  - Integumentary: no rashes  - Neurological: no numbness, tingling, no  headaches  - Psychiatric: no depression no  anxiety  - Endocrine: no heat or cold intolerance    Physical Examination:   Blood pressure 147/69, pulse 71, temperature 96.9 °F (36.1 °C), temperature source Oral, resp. rate 16, height 5' 7\" (1.702 m), weight 213 lb 3.2 oz (96.7 kg), SpO2 97 %. Estimated body mass index is 33.39 kg/(m^2) as calculated from the following:    Height as of this encounter: 5' 7\" (1.702 m). -   Weight as of this encounter: 213 lb 3.2 oz (96.7 kg).   - General: pleasant, no distress, good eye contact  - HEENT: no pallor, no periorbital edema, EOMI  - Neck: supple, no thyromegaly, no nodules  - Cardiovascular: regular, normal rate, normal S1 and S2, no murmurs  - Respiratory: clear to auscultation bilaterally  - Gastrointestinal: soft, nontender, nondistended,  BS +  - Musculoskeletal: no proximal muscle weakness in upper or lower extremities  - Integumentary: no acanthosis nigricans,no edema,   - Neurological: ,alert and oriented  - Psychiatric: normal mood and affect  - Skin: color, texture, turgor normal.     Diabetic foot exam: Jan 2018     Left:     Vibratory sensation normal    Filament test normal sensation with micro filament   Pulse DP: 1+    Deformities: flat feet   Right:    Vibratory sensation normal   Filament test normal sensation with micro filament   Pulse DP: 1+   Deformities: bunion , flat feet          right bunion , flat feet   Data Reviewed:     [] Glucose records reviewed. [] See glucose records for details (to be scanned). [] A1C  [] Reviewed labs      Assessment/Plan:     1. Type 2 diabetes mellitus with hyperglycemia, without long-term current use of insulin ([facility] [address] [facility]    2. Mixed hyperlipidemia    3. Essential hypertension        1. Type 2 Diabetes Mellitus   Lab Results   Component Value Date/Time    Hemoglobin A1c 7.6 07/25/2017 01:44 PM    Hemoglobin A1c (POC) 8.5 01/30/2018 01:51 PM    Hemoglobin A1c, External 8.3 11/05/2016 12:57 PM   uncontrolled    glipizide to 10 mg BID, Metformin   Januvia - has not started yet   Wants TLC  Risks of hypoglycemia discussed   Advised to check glucose 2 times daily    Discussed the importance of checking home glucose regularly and taking all of their scheduled medications in order to have the best possible outcome. Reviewed the complications and importance of diet. 2.  HTN : Continue current therapy     3. Hyperlipidemia : Continue statin. 4.Obesity:Body mass index is 33.39 kg/(m^2). Discussed about the importance of exercise and carbohydrate portion control. There are no Patient Instructions on file for this visit. Follow-up Disposition:  Return in about 5 months (around 6/30/2018). Thank you for allowing me to participate in the care of this patient.     Arben Velazquez MD      Patient verbalized understanding

## 2018-01-31 LAB
ALBUMIN/CREAT UR: 12.1 MG/G CREAT (ref 0–30)
CREAT UR-MCNC: 193.5 MG/DL
MICROALBUMIN UR-MCNC: 23.4 UG/ML

## 2018-06-29 ENCOUNTER — OFFICE VISIT (OUTPATIENT)
Dept: ENDOCRINOLOGY | Age: 73
End: 2018-06-29

## 2018-06-29 VITALS
OXYGEN SATURATION: 97 % | RESPIRATION RATE: 16 BRPM | HEIGHT: 67 IN | TEMPERATURE: 97.1 F | BODY MASS INDEX: 32.96 KG/M2 | WEIGHT: 210 LBS | HEART RATE: 64 BPM | SYSTOLIC BLOOD PRESSURE: 146 MMHG | DIASTOLIC BLOOD PRESSURE: 80 MMHG

## 2018-06-29 DIAGNOSIS — E78.2 MIXED HYPERLIPIDEMIA: ICD-10-CM

## 2018-06-29 DIAGNOSIS — E11.65 TYPE 2 DIABETES MELLITUS WITH HYPERGLYCEMIA, WITHOUT LONG-TERM CURRENT USE OF INSULIN (HCC): ICD-10-CM

## 2018-06-29 DIAGNOSIS — E11.65 TYPE 2 DIABETES MELLITUS WITH HYPERGLYCEMIA, WITHOUT LONG-TERM CURRENT USE OF INSULIN (HCC): Primary | ICD-10-CM

## 2018-06-29 DIAGNOSIS — I10 ESSENTIAL HYPERTENSION: ICD-10-CM

## 2018-06-29 LAB
GLUCOSE POC: 168 MG/DL
HBA1C MFR BLD HPLC: 8.6 %

## 2018-06-29 NOTE — PROGRESS NOTES
Perez Waddell is a 75 y.o. male here for   Chief Complaint   Patient presents with    Diabetes       Functional glucose monitor and record keeping system? - yes  Eye exam within last year? - [address] exam within last year? - within last year    1. Have you been to the ER, urgent care clinic since your last visit? Hospitalized since your last visit? - no    2. Have you seen or consulted any other health care providers outside of the [facility] since your last visit?   Include any pap smears or colon screening.- no, PCP retired

## 2018-06-29 NOTE — PROGRESS NOTES
[facility]               Sarah Hein MD        [address]:[phone] Fax [phone]               Patient Information  Date:6/29/2018  Name : Kimani Fink 75 y.o.     YOB: 1945         PCP : Gerber Quarles MD         Chief Complaint   Patient presents with    Diabetes       History of Present Illness: Kimani Fink is a 75 y.o. male here for fu  of  Type 2 Diabetes Mellitus. Type 2 Diabetes was diagnosed in 2006 . End organ effects of diabetes: none. Cardiovascular risk factors: dyslipidemia, diabetes mellitus   Monitoring frequency:1 /day  Checking glucose at home  Has not noticed significant hypoglycemia   No improvement in the glycemic control, diet could be better  He is trying to walk  Fastings are still high  He has not started Januvia      Compliant with medications          Wt Readings from Last 3 Encounters:   06/29/18 210 lb (95.3 kg)   01/30/18 213 lb 3.2 oz (96.7 kg)   07/26/17 205 lb 12.8 oz (93.4 kg)       BP Readings from Last 3 Encounters:   06/29/18 154/74   01/30/18 147/69   07/26/17 131/63           Past Medical History:   Diagnosis Date    [facility]     Gout     HTN (hypertension)     Hyperlipemia     Vitamin D deficiency      Current Outpatient Prescriptions   Medication Sig    glipiZIDE (GLUCOTROL) 10 mg tablet Take 10 mg before breakfast and take 10 mg before dinner.  metFORMIN (GLUCOPHAGE) 1,000 mg tablet Take 1 Tab by mouth two (2) times daily (with meals).  valsartan (DIOVAN) 160 mg tablet Take 1 Tab by mouth two (2) times a day.  colchicine 0.6 mg tablet Take 1 Tab by mouth daily. (Patient taking differently: Take 0.6 mg by mouth as needed.)    simvastatin (ZOCOR) 20 mg tablet Take  by mouth nightly.  cholecalciferol (VITAMIN D3) 1,000 unit cap Take 400 Units by mouth daily.  VIT A/VIT C/VIT E/ZINC/COPPER (PRESERVISION AREDS PO) Take  by mouth.     aspirin delayed-release 81 mg tablet Take 81 mg by mouth daily.  OTHER Take 2 mg by mouth daily. Super Beta Prostate    SITagliptin (JANUVIA) 100 mg tablet Take 1 Tab by mouth every morning. No current facility-administered medications for this visit. Allergies   Allergen Reactions    Lisinopril Swelling         Review of Systems:  - ons  - Respiratory: no cough no shortness of breath  - Gastrointestinal: no dysphagia no  abdominal pain  - Musculoskeletal: no joint pains no  weakness  - Integumentary: no rashes  - Neurological: no numbness, tingling, no  headaches  - Psychiatric: no depression no  anxiety  - Endocrine: no heat or cold intolerance    Physical Examination:   Blood pressure 154/74, pulse 64, temperature 97.1 °F (36.2 °C), temperature source Oral, resp. rate 16, height 5' 7\" (1.702 m), weight 210 lb (95.3 kg), SpO2 97 %. Estimated body mass index is 32.89 kg/(m^2) as calculated from the following:    Height as of this encounter: 5' 7\" (1.702 m). -   Weight as of this encounter: 210 lb (95.3 kg).   - General: pleasant, no distress, good eye contact  - HEENT: no pallor, no periorbital edema, EOMI  - Neck: supple, no thyromegaly, no nodules  - Cardiovascular: regular, normal rate, normal S1 and S2, no murmurs  - Respiratory: clear to auscultation bilaterally  - Gastrointestinal: soft, nontender, nondistended,  BS +  - Musculoskeletal: no proximal muscle weakness in upper or lower extremities  - Integumentary: no acanthosis nigricans,no edema,   - Neurological: ,alert and oriented  - Psychiatric: normal mood and affect  - Skin: color, texture, turgor normal.     Diabetic foot exam: Jan 2018     Left:     Vibratory sensation normal    Filament test normal sensation with micro filament   Pulse DP: 1+    Deformities: flat feet   Right:    Vibratory sensation normal   Filament test normal sensation with micro filament   Pulse DP: 1+   Deformities: bunion , flat feet          right bunion , flat feet Data Reviewed:     [] Glucose records reviewed. [] See glucose records for details (to be scanned). [] A1C  [] Reviewed labs      Assessment/Plan:     1. Type 2 diabetes mellitus with hyperglycemia, without long-term current use of insulin ([facility]    2. Mixed hyperlipidemia    3. Essential hypertension        1. Type 2 Diabetes Mellitus   Lab Results   Component Value Date/Time    Hemoglobin A1c 7.6 (H) 07/25/2017 01:44 PM    Hemoglobin A1c (POC) 8.6 06/29/2018 01:30 PM    Hemoglobin A1c, External 8.3 11/05/2016 12:57 PM   uncontrolled    glipizide to 10 mg BID, Metformin   Januvia -resume  Calorie restriction  Risks of hypoglycemia discussed   Advised to check glucose 2 times daily    Discussed the importance of checking home glucose regularly and taking all of their scheduled medications in order to have the best possible outcome. Reviewed the complications and importance of diet. 2.  HTN : Continue current therapy     3. Hyperlipidemia : Continue statin. 4.Obesity:Body mass index is 32.89 kg/(m^2). Discussed about the importance of exercise and carbohydrate portion control. There are no Patient Instructions on file for this visit. Follow-up Disposition: Not on File    Thank you for allowing me to participate in the care of this patient.     To Soler MD      Patient verbalized understanding

## 2018-06-29 NOTE — MR AVS SNAPSHOT
[address] [place] [address] 
[phone] Patient: Babatunde Horne MRN: [id number] FCQ:[id number] Visit Information Date & Time Provider Department Dept. Phone Encounter #  
 6/29/2018  1:30 PM Jama oRdas MD [facility] [phone] [id number] Follow-up Instructions Return in about 5 months (around 11/29/2018). Upcoming Health Maintenance Date Due  
 EYE EXAM RETINAL OR DILATED Q1 4/3/1954 DTaP/Tdap/Td series (1 - Tdap) 4/3/1965 FOBT Q 1 YEAR AGE 50-75 4/3/1994 ZOSTER VACCINE AGE 60> 2/3/2004 GLAUCOMA SCREENING Q2Y 4/3/2009 Pneumococcal 65+ Low/Medium Risk (1 of 2 - PCV13) 4/3/2009 LIPID PANEL Q1 7/25/2018 HEMOGLOBIN A1C Q6M 7/30/2018 Influenza Age 5 to Adult 8/1/2018 FOOT EXAM Q1 1/30/2019 MICROALBUMIN Q1 1/30/2019 Allergies as of 6/29/2018  Review Complete On: 6/29/2018 By: Jama Rodas MD  
  
 Severity Noted Reaction Type Reactions Lisinopril  01/20/2017    Swelling Current Immunizations  Never Reviewed No immunizations on file. Not reviewed this visit You Were Diagnosed With   
  
 Codes Comments Type 2 diabetes mellitus with hyperglycemia, without long-term current use of insulin (HCC)    -  Primary ICD-10-CM: E11.65 ICD-9-CM: 250.00, 790.29 Mixed hyperlipidemia     ICD-10-CM: E78.2 ICD-9-CM: 272.2 Essential hypertension     ICD-10-CM: I10 
ICD-9-CM: 401.9 Vitals BP Pulse Temp Resp Height(growth percentile) Weight(growth percentile) 146/80 64 97.1 °F (36.2 °C) (Oral) 16 5' 7\" (1.702 m) 210 lb (95.3 kg) SpO2 BMI Smoking Status 97% 32.89 kg/m2 Former Smoker Vitals History BMI and BSA Data Body Mass Index Body Surface Area  
 32.89 kg/m 2 2.12 m 2 Preferred Pharmacy Pharmacy Name Phone  [address], [address] [phone] Your Updated Medication List  
  
   
This list is accurate as of 6/29/18  2:15 PM.  Always use your most recent med list.  
  
  
  
  
 aspirin delayed-release 81 mg tablet Take 81 mg by mouth daily. colchicine 0.6 mg tablet Take 1 Tab by mouth daily. glipiZIDE 10 mg tablet Commonly known as:  Jenni Huger Take 10 mg before breakfast and take 10 mg before dinner. metFORMIN 1,000 mg tablet Commonly known as:  GLUCOPHAGE Take 1 Tab by mouth two (2) times daily (with meals). OTHER Take 2 mg by mouth daily. Super Beta Prostate PRESERVISION AREDS PO Take  by mouth. simvastatin 20 mg tablet Commonly known as:  ZOCOR Take  by mouth nightly. SITagliptin 100 mg tablet Commonly known as:  Obey Cobble Take 1 Tab by mouth every morning. valsartan 160 mg tablet Commonly known as:  DIOVAN Take 1 Tab by mouth two (2) times a day. VITAMIN D3 1,000 unit Cap Generic drug:  cholecalciferol Take 400 Units by mouth daily. We Performed the Following AMB POC GLUCOSE BLOOD, BY GLUCOSE MONITORING DEVICE [49688 CPT(R)] AMB POC HEMOGLOBIN A1C [07823 CPT(R)] Follow-up Instructions Return in about 5 months (around 11/29/2018). Introducing [facility]! Irais De La Garza introduces Sightly patient portal. Now you can access parts of your medical record, email your doctor's office, and request medication refills online. 1. In your internet browser, go to [email or web address] 2. Click on the First Time User? Click Here link in the Sign In box. You will see the New Member Sign Up page. 3. Enter your Sightly Access Code exactly as it appears below. You will not need to use this code after youve completed the sign-up process. If you do not sign up before the expiration date, you must request a new code. · Sightly Access Code: [email or web address] Expires: 9/27/2018 12:54 PM 
 
 4. Enter the last four digits of your Social Security Number (xxxx) and Date of Birth (mm/dd/yyyy) as indicated and click Submit. You will be taken to the next sign-up page. 5. Create a Farmol ID. This will be your Farmol login ID and cannot be changed, so think of one that is secure and easy to remember. 6. Create a Farmol password. You can change your password at any time. 7. Enter your Password Reset Question and Answer. This can be used at a later time if you forget your password. 8. Enter your e-mail address. You will receive e-mail notification when new information is available in [address] 9. Click Sign Up. You can now view and download portions of your medical record. 10. Click the Download Summary menu link to download a portable copy of your medical information. If you have questions, please visit the Frequently Asked Questions section of the Farmol website. Remember, Farmol is NOT to be used for urgent needs. For medical emergencies, dial 911. Now available from your iPhone and Android! Please provide this summary of care documentation to your next provider. Your primary care clinician is listed as Isreal Soto. If you have any questions after today's visit, please call [phone].

## 2018-07-12 ENCOUNTER — OFFICE VISIT (OUTPATIENT)
Dept: FAMILY MEDICINE CLINIC | Age: 73
End: 2018-07-12

## 2018-07-12 VITALS
DIASTOLIC BLOOD PRESSURE: 65 MMHG | TEMPERATURE: 98.3 F | SYSTOLIC BLOOD PRESSURE: 130 MMHG | RESPIRATION RATE: 20 BRPM | BODY MASS INDEX: 32.49 KG/M2 | HEART RATE: 65 BPM | OXYGEN SATURATION: 98 % | WEIGHT: 207 LBS | HEIGHT: 67 IN

## 2018-07-12 DIAGNOSIS — Z76.89 ENCOUNTER TO ESTABLISH CARE: Primary | ICD-10-CM

## 2018-07-12 DIAGNOSIS — E78.2 MIXED HYPERLIPIDEMIA: ICD-10-CM

## 2018-07-12 DIAGNOSIS — E11.8 TYPE 2 DIABETES MELLITUS WITH COMPLICATION, WITHOUT LONG-TERM CURRENT USE OF INSULIN (HCC): ICD-10-CM

## 2018-07-12 DIAGNOSIS — I10 ESSENTIAL HYPERTENSION: ICD-10-CM

## 2018-07-12 RX ORDER — BLOOD SUGAR DIAGNOSTIC
STRIP MISCELLANEOUS
COMMUNITY
Start: 2018-04-16 | End: 2018-08-22 | Stop reason: SDUPTHER

## 2018-07-12 NOTE — PATIENT INSTRUCTIONS
Learning About Foot and Toenail Care Learning About Foot and Toenail Care Checking your loved one's feet and keeping them clean and soft can help prevent cracks and infection in the skin. This is especially important for people who have diabetes. Keeping toenails trimmed-and polished if that's what the person likes-also helps the person feel well-groomed. If the person you care for has diabetes or has foot problems, such as bad bunions and corns, think about taking them to see a podiatrist. This is a doctor who specializes in the care of the feet. Sometimes a podiatrist will come to the home if the person can't go out for visits. Try to take the person for salon pedicures if that is what they want. It's a chance to get out and see people and continue a favorite activity. You can do basic nail care at home. Usually all you need to do is keep the nails clean and at a safe length. How do you trim someone's toenails? Try to trim the person's nails every week. Or check the nails each week to see if they need to be trimmed. It's easiest to trim nails after the person has had a shower or foot bath. It makes the nails softer and easier to trim. Start by gathering your supplies. You will need toenail clippers and a nail file. You may also need nail polish and nail polish remover. To trim the nails: 
1. Wash and dry your hands. You don't need to wear gloves. 2. Use nail polish remover to take off any polish. 3. Hold the person's foot and toe steady with one hand while you trim the nail with your other hand. Trim the nails straight across. Leave the nails a little longer at the corners so that the sharp ends don't cut into the skin. 4. Keep the nails no longer than the tip of the toes. 5. Let the nails dry if they are still damp and soft. 6. Use a nail file to gently smooth the edges of the nails, especially at the corners. They may be sharp after the nails are cut straight.  
7. Apply nail polish, if the person wants it. If the person's nails are thick and discolored, it may be safest to have a podiatrist cut them. What else do you need to know? When you're caring for someone's nails, it is important to remember not to trim or cut the cuticles. A minor cut in a cuticle could lead to an infection. Wash the feet daily in the shower or bath or in a basin made for washing feet. It's extra important to wash the feet carefully if the person has diabetes. After washing the feet, dry gently. Put lotion on the feet, especially on the heels. But don't put it between the toes. If the person doesn't have diabetes and you see signs of athlete's foot (such as dry, cracking, or itchy skin between the toes), you can try an over-the-counter medicine. These medicines can kill the fungus that causes athlete's foot. If the problem doesn't go away, talk to the person's doctor. Look every day for cuts or signs of infection, such as pain, swelling, redness, or warmth. If you see any of these signs-especially in someone who has diabetes-call the doctor. Where can you learn more? Go to http://meka-maegan.info/. Enter A726 in the search box to learn more about \"Learning About Foot and Toenail Care. \" Current as of: October 6, 2017 Content Version: 11.7 © 8455-9879 [employer]. Care instructions adapted under license by [employer] (which disclaims liability or warranty for this information). If you have questions about a medical condition or this instruction, always ask your healthcare professional. [place] [address] any warranty or liability for your use of this information. Learning About Diabetes Food Guidelines Your Care Instructions Meal planning is important to manage diabetes. It helps keep your blood sugar at a target level (which you set with your doctor). You don't have to eat special foods.  You can eat what your family eats, including sweets once in a while. But you do have to pay attention to how often you eat and how much you eat of certain foods. You may want to work with a dietitian or a certified diabetes educator (CDE) to help you plan meals and snacks. A dietitian or CDE can also help you lose weight if that is one of your goals. What should you know about eating carbs? Managing the amount of carbohydrate (carbs) you eat is an important part of healthy meals when you have diabetes. Carbohydrate is found in many foods. · Learn which foods have carbs. And learn the amounts of carbs in different foods. ¨ Bread, cereal, pasta, and rice have about 15 grams of carbs in a serving. A serving is 1 slice of bread (1 ounce), ½ cup of cooked cereal, or 1/3 cup of cooked pasta or rice. ¨ Fruits have 15 grams of carbs in a serving. A serving is 1 small fresh fruit, such as an apple or orange; ½ of a banana; ½ cup of cooked or canned fruit; ½ cup of fruit juice; 1 cup of melon or raspberries; or 2 tablespoons of dried fruit. ¨ Milk and no-sugar-added yogurt have 15 grams of carbs in a serving. A serving is 1 cup of milk or 2/3 cup of no-sugar-added yogurt. ¨ Starchy vegetables have 15 grams of carbs in a serving. A serving is ½ cup of mashed potatoes or sweet potato; 1 cup winter squash; ½ of a small baked potato; ½ cup of cooked beans; or ½ cup cooked corn or green peas. · Learn how much carbs to eat each day and at each meal. A dietitian or CDE can teach you how to keep track of the amount of carbs you eat. This is called carbohydrate counting. · If you are not sure how to count carbohydrate grams, use the Plate Method to plan meals. It is a good, quick way to make sure that you have a balanced meal. It also helps you spread carbs throughout the day. ¨ Divide your plate by types of foods. Put non-starchy vegetables on half the plate, meat or other protein food on one-quarter of the plate, and a grain or starchy vegetable in the final quarter of the plate.  To this you can add a small piece of fruit and 1 cup of milk or yogurt, depending on how many carbs you are supposed to eat at a meal. 
· Try to eat about the same amount of carbs at each meal. Do not \"save up\" your daily allowance of carbs to eat at one meal. 
· Proteins have very little or no carbs per serving. Examples of proteins are beef, chicken, turkey, fish, eggs, tofu, cheese, cottage cheese, and peanut butter. A serving size of meat is 3 ounces, which is about the size of a deck of cards. Examples of meat substitute serving sizes (equal to 1 ounce of meat) are 1/4 cup of cottage cheese, 1 egg, 1 tablespoon of peanut butter, and ½ cup of tofu. How can you eat out and still eat healthy? · Learn to estimate the serving sizes of foods that have carbohydrate. If you measure food at home, it will be easier to estimate the amount in a serving of restaurant food. · If the meal you order has too much carbohydrate (such as potatoes, corn, or baked beans), ask to have a low-carbohydrate food instead. Ask for a salad or green vegetables. · If you use insulin, check your blood sugar before and after eating out to help you plan how much to eat in the future. · If you eat more carbohydrate at a meal than you had planned, take a walk or do other exercise. This will help lower your blood sugar. What else should you know? · Limit saturated fat, such as the fat from meat and dairy products. This is a healthy choice because people who have diabetes are at higher risk of heart disease. So choose lean cuts of meat and nonfat or low-fat dairy products. Use olive or canola oil instead of butter or shortening when cooking. · Don't skip meals. Your blood sugar may drop too low if you skip meals and take insulin or certain medicines for diabetes. · Check with your doctor before you drink alcohol. Alcohol can cause your blood sugar to drop too low.  Alcohol can also cause a bad reaction if you take certain diabetes medicines. Follow-up care is a key part of your treatment and safety. Be sure to make and go to all appointments, and call your doctor if you are having problems. It's also a good idea to know your test results and keep a list of the medicines you take. Where can you learn more? Go to http://meka-maegan.info/. Enter C127 in the search box to learn more about \"Learning About Diabetes Food Guidelines. \" Current as of: December 7, 2017 Content Version: 11.7 © 3384-2027 [employer]. Care instructions adapted under license by [employer] (which disclaims liability or warranty for this information). If you have questions about a medical condition or this instruction, always ask your healthcare professional. [address] any warranty or liability for your use of this information. Learning About Meal Planning for Diabetes Why plan your meals? Meal planning can be a key part of managing diabetes. Planning meals and snacks with the right balance of carbohydrate, protein, and fat can help you keep your blood sugar at the target level you set with your doctor. You don't have to eat special foods. You can eat what your family eats, including sweets once in a while. But you do have to pay attention to how often you eat and how much you eat of certain foods. You may want to work with a dietitian or a certified diabetes educator. He or she can give you tips and meal ideas and can answer your questions about meal planning. This health professional can also help you reach a healthy weight if that is one of your goals. What plan is right for you? Your dietitian or diabetes educator may suggest that you start with the plate format or carbohydrate counting. The plate format The plate format is a simple way to help you manage how you eat. You plan meals by learning how much space each food should take on a plate.  Using the plate format helps you spread carbohydrate throughout the day. It can make it easier to keep your blood sugar level within your target range. It also helps you see if you're eating healthy portion sizes. To use the plate format, you put non-starchy vegetables on half your plate. Add meat or meat substitutes on one-quarter of the plate. Put a grain or starchy vegetable (such as brown rice or a potato) on the final quarter of the plate. You can add a small piece of fruit and some low-fat or fat-free milk or yogurt, depending on your carbohydrate goal for each meal. 
Here are some tips for using the plate format: · Make sure that you are not using an oversized plate. A 9-inch plate is best. Many restaurants use larger plates. · Get used to using the plate format at home. Then you can use it when you eat out. · Write down your questions about using the plate format. Talk to your doctor, a dietitian, or a diabetes educator about your concerns. Carbohydrate counting With carbohydrate counting, you plan meals based on the amount of carbohydrate in each food. Carbohydrate raises blood sugar higher and more quickly than any other nutrient. It is found in desserts, breads and cereals, and fruit. It's also found in starchy vegetables such as potatoes and corn, grains such as rice and pasta, and milk and yogurt. Spreading carbohydrate throughout the day helps keep your blood sugar levels within your target range. Your daily amount depends on several things, including your weight, how active you are, which diabetes medicines you take, and what your goals are for your blood sugar levels. A registered dietitian or diabetes educator can help you plan how much carbohydrate to include in each meal and snack. A guideline for your daily amount of carbohydrate is: · 45 to 60 grams at each meal. That's about the same as 3 to 4 carbohydrate servings. · 15 to 20 grams at each snack. That's about the same as 1 carbohydrate serving.  
The Nutrition Facts label on packaged foods tells you how much carbohydrate is in a serving of the food. First, look at the serving size on the food label. Is that the amount you eat in a serving? All of the nutrition information on a food label is based on that serving size. So if you eat more or less than that, you'll need to adjust the other numbers. Total carbohydrate is the next thing you need to look for on the label. If you count carbohydrate servings, one serving of carbohydrate is 15 grams. For foods that don't come with labels, such as fresh fruits and vegetables, you'll need a guide that lists carbohydrate in these foods. Ask your doctor, dietitian, or diabetes educator about books or other nutrition guides you can use. If you take insulin, you need to know how many grams of carbohydrate are in a meal. This lets you know how much rapid-acting insulin to take before you eat. If you use an insulin pump, you get a constant rate of insulin during the day. So the pump must be programmed at meals to give you extra insulin to cover the rise in blood sugar after meals. When you know how much carbohydrate you will eat, you can take the right amount of insulin. Or, if you always use the same amount of insulin, you need to make sure that you eat the same amount of carbohydrate at meals. If you need more help to understand carbohydrate counting and food labels, ask your doctor, dietitian, or diabetes educator. How do you get started with meal planning? Here are some tips to get started: 
· Plan your meals a week at a time. Don't forget to include snacks too. · Use cookbooks or online recipes to plan several main meals. Plan some quick meals for busy nights. You also can double some recipes that freeze well. Then you can save half for other busy nights when you don't have time to cook. · Make sure you have the ingredients you need for your recipes. If you're running low on basic items, put these items on your shopping list too.  
· List foods that you use to make breakfasts, lunches, and snacks. List plenty of fruits and vegetables. · Post this list on the refrigerator. Add to it as you think of more things you need. · Take the list to the store to do your weekly shopping. Follow-up care is a key part of your treatment and safety. Be sure to make and go to all appointments, and call your doctor if you are having problems. It's also a good idea to know your test results and keep a list of the medicines you take. Where can you learn more? Go to http://meka-maegan.info/. Pietro Jim in the search box to learn more about \"Learning About Meal Planning for Diabetes. \" Current as of: December 7, 2017 Content Version: 11.7 © 2681-9223 [employer]. Care instructions adapted under license by [employer] (which disclaims liability or warranty for this information). If you have questions about a medical condition or this instruction, always ask your healthcare professional. [address] any warranty or liability for your use of this information.

## 2018-07-12 NOTE — PROGRESS NOTES
1. Have you been to the ER, urgent care clinic since your last visit? Hospitalized since your last visit? No    2. Have you seen or consulted any other health care providers outside of the [address] [place] [address] since your last visit? Include any pap smears or colon screening. No  Reviewed record in preparation for visit and have necessary documentation  Pt did not bring medication to office visit for review  Information was given to pt on Advanced Directives, Living Will  opportunity was given for questions  Goals that were addressed and/or need to be completed during or after this appointment include   Health Maintenance Due   Topic Date Due    EYE EXAM RETINAL OR DILATED Q1  [birth year]/1954    DTaP/Tdap/Td series (1 - Tdap) 04/03/1965    FOBT Q 1 YEAR AGE 50-75  04/03/1994    ZOSTER VACCINE AGE 60>  02/03/2004    GLAUCOMA SCREENING Q2Y  04/03/2009    Pneumococcal 65+ Low/Medium Risk (1 of 2 - PCV13) 04/03/2009    LIPID PANEL Q1  07/25/2018     - check for functional glucose monitor and record keeping system- yes and testing once a day  Pt was given BS record log to document home readings and return to office for review  Diabetic Bundle:  LDL-9  A1C-8.6  BP-130/65  Smoking?no  Anticoagulation medication?  yes  Eye exam dilated?[facility]

## 2018-07-12 NOTE — MR AVS SNAPSHOT
Pasquale Nicole 
 
 
 [address] [address] 
[phone] Patient: Glory Brennan MRN: [id number] Visit Information Date & Time Provider Department Dept. Phone Encounter #  
 7/12/2018 10:00 AM Daryl Rosales, Reyna Justice [id number] Follow-up Instructions Return in about 1 month (around 8/12/2018), or if symptoms worsen or fail to improve. Your Appointments 12/4/2018  1:30 PM  
ROUTINE CARE with Shannan Dodd MD  
[facility] [facility]) Appt Note: 5 mon fu DM  
 [address] [facility] [address]  
[phone]  
  
   
 [address] [place] [id number] Upcoming Health Maintenance Date Due  
 EYE EXAM RETINAL OR DILATED Q1 4/3/1954 DTaP/Tdap/Td series (1 - Tdap) 4/3/1965 FOBT Q 1 YEAR AGE 50-75 4/3/1994 ZOSTER VACCINE AGE 60> 2/3/2004 GLAUCOMA SCREENING Q2Y 4/3/2009 Pneumococcal 65+ Low/Medium Risk (1 of 2 - PCV13) 4/3/2009 LIPID PANEL Q1 7/25/2018 Influenza Age 5 to Adult 8/1/2018 HEMOGLOBIN A1C Q6M 12/29/2018 FOOT EXAM Q1 1/30/2019 MICROALBUMIN Q1 1/30/2019 Allergies as of 7/12/2018  Review Complete On: 7/12/2018 By: Dimas Caputo LPN Severity Noted Reaction Type Reactions Lisinopril  01/20/2017    Swelling Current Immunizations  Never Reviewed No immunizations on file. Not reviewed this visit You Were Diagnosed With   
  
 Codes Comments Encounter to establish care    -  Primary ICD-10-CM: Z76.89 
ICD-9-CM: V65.8 Type 2 diabetes mellitus with complication, without long-term current use of insulin (HCC)     ICD-10-CM: E11.8 ICD-9-CM: 250.90 Essential hypertension     ICD-10-CM: I10 
ICD-9-CM: 401.9 Mixed hyperlipidemia     ICD-10-CM: E78.2 ICD-9-CM: 272.2 Vitals BP Pulse Temp Resp Height(growth percentile) Weight(growth percentile) 130/65 (BP 1 Location: Left arm, BP Patient Position: Sitting) 65 98.3 °F (36.8 °C) (Oral) 20 5' 7\" (1.702 m) 207 lb (93.9 kg) SpO2 BMI Smoking Status 98% 32.42 kg/m2 Former Smoker Vitals History BMI and BSA Data Body Mass Index Body Surface Area  
 32.42 kg/m 2 2.11 m 2 Preferred Pharmacy Pharmacy Name Phone Ellen Orosc[facility] [address] [place] [address] [facility] [address] [phone] Your Updated Medication List  
  
   
This list is accurate as of 7/12/18 11:34 AM.  Always use your most recent med list.  
  
  
  
  
 ACCU-CHEK SMARTVIEW TEST STRIP strip Generic drug:  glucose blood VI test strips Testing once a day  
  
 aspirin delayed-release 81 mg tablet Take 81 mg by mouth daily. colchicine 0.6 mg tablet Take 1 Tab by mouth daily. Diphth, Pertus(Acell), Tetanus 2.5-8-5 Lf-mcg-Lf/0.5mL Syrg Commonly known as:  BOOSTRIX TDAP  
0.5 mL by IntraMUSCular route once for 1 dose. glipiZIDE 10 mg tablet Commonly known as:  Viktoria Barrier Take 10 mg before breakfast and take 10 mg before dinner. metFORMIN 1,000 mg tablet Commonly known as:  GLUCOPHAGE Take 1 Tab by mouth two (2) times daily (with meals). OTHER Take 2 mg by mouth daily. Super Beta Prostate  
  
 pneumococcal 13 gonzales conj dip 0.5 mL Syrg injection Commonly known as:  PREVNAR 13 (PF)  
0.5 mL by IntraMUSCular route once for 1 dose. PRESERVISION AREDS PO Take  by mouth. simvastatin 20 mg tablet Commonly known as:  ZOCOR Take  by mouth nightly. * SITagliptin 100 mg tablet Commonly known as:  Shama Racer Take 1 Tab by mouth every morning. * SITagliptin 100 mg tablet Commonly known as:  Shama Racer Take 1 Tab by mouth every morning. valsartan 160 mg tablet Commonly known as:  DIOVAN Take 1 Tab by mouth two (2) times a day. varicella-zoster recombinant (PF) 50 mcg/0.5 mL Susr injection Commonly known as:  SHINGRIX (PF)  
0.5 mL by IntraMUSCular route once for 1 dose. VITAMIN D3 1,000 unit Cap Generic drug:  cholecalciferol Take 400 Units by mouth daily. * Notice: This list has 2 medication(s) that are the same as other medications prescribed for you. Read the directions carefully, and ask your doctor or other care provider to review them with you. Prescriptions Printed Refills Akhil Cart,, Tetanus (BOOSTRIX TDAP) 2.5-8-5 Lf-mcg-Lf/0.5mL syrg 0 Si.5 mL by IntraMUSCular route once for 1 dose. Class: Print Route: IntraMUSCular  
 pneumococcal 13 gonzales conj dip (PREVNAR 13, PF,) 0.5 mL syrg injection 0 Si.5 mL by IntraMUSCular route once for 1 dose. Class: Print Route: IntraMUSCular  
 varicella-zoster recombinant, PF, (SHINGRIX, PF,) 50 mcg/0.5 mL susr injection 0 Si.5 mL by IntraMUSCular route once for 1 dose. Class: Print Route: IntraMUSCular We Performed the Following CBC W/O DIFF [11915 CPT(R)] LIPID PANEL [17702 CPT(R)] METABOLIC PANEL, COMPREHENSIVE [61976 CPT(R)] OCCULT BLOOD, IMMUNOASSAY (FIT) [id number] CPT(R)] Follow-up Instructions Return in about 1 month (around 2018), or if symptoms worsen or fail to improve. Patient Instructions Learning About Foot and Toenail Care Learning About Foot and Toenail Care Checking your loved one's feet and keeping them clean and soft can help prevent cracks and infection in the skin. This is especially important for people who have diabetes. Keeping toenails trimmed-and polished if that's what the person likes-also helps the person feel well-groomed.  
If the person you care for has diabetes or has foot problems, such as bad bunions and corns, think about taking them to see a podiatrist. This is a doctor who specializes in the care of the feet. Sometimes a podiatrist will come to the home if the person can't go out for visits. Try to take the person for salon pedicures if that is what they want. It's a chance to get out and see people and continue a favorite activity. You can do basic nail care at home. Usually all you need to do is keep the nails clean and at a safe length. How do you trim someone's toenails? Try to trim the person's nails every week. Or check the nails each week to see if they need to be trimmed. It's easiest to trim nails after the person has had a shower or foot bath. It makes the nails softer and easier to trim. Start by gathering your supplies. You will need toenail clippers and a nail file. You may also need nail polish and nail polish remover. To trim the nails: 
1. Wash and dry your hands. You don't need to wear gloves. 2. Use nail polish remover to take off any polish. 3. Hold the person's foot and toe steady with one hand while you trim the nail with your other hand. Trim the nails straight across. Leave the nails a little longer at the corners so that the sharp ends don't cut into the skin. 4. Keep the nails no longer than the tip of the toes. 5. Let the nails dry if they are still damp and soft. 6. Use a nail file to gently smooth the edges of the nails, especially at the corners. They may be sharp after the nails are cut straight. 7. Apply nail polish, if the person wants it. If the person's nails are thick and discolored, it may be safest to have a podiatrist cut them. What else do you need to know? When you're caring for someone's nails, it is important to remember not to trim or cut the cuticles. A minor cut in a cuticle could lead to an infection. Wash the feet daily in the shower or bath or in a basin made for washing feet. It's extra important to wash the feet carefully if the person has diabetes. After washing the feet, dry gently.  Put lotion on the feet, especially on the heels. But don't put it between the toes. If the person doesn't have diabetes and you see signs of athlete's foot (such as dry, cracking, or itchy skin between the toes), you can try an over-the-counter medicine. These medicines can kill the fungus that causes athlete's foot. If the problem doesn't go away, talk to the person's doctor. Look every day for cuts or signs of infection, such as pain, swelling, redness, or warmth. If you see any of these signs-especially in someone who has diabetes-call the doctor. Where can you learn more? Go to http://meka-maegan.info/. Enter A726 in the search box to learn more about \"Learning About Foot and Toenail Care. \" Current as of: October 6, 2017 Content Version: 11.7 © 5325-9956 [employer]. Care instructions adapted under license by [employer] (which disclaims liability or warranty for this information). If you have questions about a medical condition or this instruction, always ask your healthcare professional. [place] [address] any warranty or liability for your use of this information. Learning About Diabetes Food Guidelines Your Care Instructions Meal planning is important to manage diabetes. It helps keep your blood sugar at a target level (which you set with your doctor). You don't have to eat special foods. You can eat what your family eats, including sweets once in a while. But you do have to pay attention to how often you eat and how much you eat of certain foods. You may want to work with a dietitian or a certified diabetes educator (CDE) to help you plan meals and snacks. A dietitian or CDE can also help you lose weight if that is one of your goals. What should you know about eating carbs? Managing the amount of carbohydrate (carbs) you eat is an important part of healthy meals when you have diabetes. Carbohydrate is found in many foods. · Learn which foods have carbs. And learn the amounts of carbs in different foods. ¨ Bread, cereal, pasta, and rice have about 15 grams of carbs in a serving. A serving is 1 slice of bread (1 ounce), ½ cup of cooked cereal, or 1/3 cup of cooked pasta or rice. ¨ Fruits have 15 grams of carbs in a serving. A serving is 1 small fresh fruit, such as an apple or orange; ½ of a banana; ½ cup of cooked or canned fruit; ½ cup of fruit juice; 1 cup of melon or raspberries; or 2 tablespoons of dried fruit. ¨ Milk and no-sugar-added yogurt have 15 grams of carbs in a serving. A serving is 1 cup of milk or 2/3 cup of no-sugar-added yogurt. ¨ Starchy vegetables have 15 grams of carbs in a serving. A serving is ½ cup of mashed potatoes or sweet potato; 1 cup winter squash; ½ of a small baked potato; ½ cup of cooked beans; or ½ cup cooked corn or green peas. · Learn how much carbs to eat each day and at each meal. A dietitian or CDE can teach you how to keep track of the amount of carbs you eat. This is called carbohydrate counting. · If you are not sure how to count carbohydrate grams, use the Plate Method to plan meals. It is a good, quick way to make sure that you have a balanced meal. It also helps you spread carbs throughout the day. ¨ Divide your plate by types of foods. Put non-starchy vegetables on half the plate, meat or other protein food on one-quarter of the plate, and a grain or starchy vegetable in the final quarter of the plate. To this you can add a small piece of fruit and 1 cup of milk or yogurt, depending on how many carbs you are supposed to eat at a meal. 
· Try to eat about the same amount of carbs at each meal. Do not \"save up\" your daily allowance of carbs to eat at one meal. 
· Proteins have very little or no carbs per serving. Examples of proteins are beef, chicken, turkey, fish, eggs, tofu, cheese, cottage cheese, and peanut butter.  A serving size of meat is 3 ounces, which is about the size of a deck of cards. Examples of meat substitute serving sizes (equal to 1 ounce of meat) are 1/4 cup of cottage cheese, 1 egg, 1 tablespoon of peanut butter, and ½ cup of tofu. How can you eat out and still eat healthy? · Learn to estimate the serving sizes of foods that have carbohydrate. If you measure food at home, it will be easier to estimate the amount in a serving of restaurant food. · If the meal you order has too much carbohydrate (such as potatoes, corn, or baked beans), ask to have a low-carbohydrate food instead. Ask for a salad or green vegetables. · If you use insulin, check your blood sugar before and after eating out to help you plan how much to eat in the future. · If you eat more carbohydrate at a meal than you had planned, take a walk or do other exercise. This will help lower your blood sugar. What else should you know? · Limit saturated fat, such as the fat from meat and dairy products. This is a healthy choice because people who have diabetes are at higher risk of heart disease. So choose lean cuts of meat and nonfat or low-fat dairy products. Use olive or canola oil instead of butter or shortening when cooking. · Don't skip meals. Your blood sugar may drop too low if you skip meals and take insulin or certain medicines for diabetes. · Check with your doctor before you drink alcohol. Alcohol can cause your blood sugar to drop too low. Alcohol can also cause a bad reaction if you take certain diabetes medicines. Follow-up care is a key part of your treatment and safety. Be sure to make and go to all appointments, and call your doctor if you are having problems. It's also a good idea to know your test results and keep a list of the medicines you take. Where can you learn more? Go to http://meka-maegan.info/. Enter P873 in the search box to learn more about \"Learning About Diabetes Food Guidelines. \" Current as of: December 7, 2017 Content Version: 11.7 © 2730-1995 [employer]. Care instructions adapted under license by [employer] (which disclaims liability or warranty for this information). If you have questions about a medical condition or this instruction, always ask your healthcare professional. [address] any warranty or liability for your use of this information. Learning About Meal Planning for Diabetes Why plan your meals? Meal planning can be a key part of managing diabetes. Planning meals and snacks with the right balance of carbohydrate, protein, and fat can help you keep your blood sugar at the target level you set with your doctor. You don't have to eat special foods. You can eat what your family eats, including sweets once in a while. But you do have to pay attention to how often you eat and how much you eat of certain foods. You may want to work with a dietitian or a certified diabetes educator. He or she can give you tips and meal ideas and can answer your questions about meal planning. This health professional can also help you reach a healthy weight if that is one of your goals. What plan is right for you? Your dietitian or diabetes educator may suggest that you start with the plate format or carbohydrate counting. The plate format The plate format is a simple way to help you manage how you eat. You plan meals by learning how much space each food should take on a plate. Using the plate format helps you spread carbohydrate throughout the day. It can make it easier to keep your blood sugar level within your target range. It also helps you see if you're eating healthy portion sizes. To use the plate format, you put non-starchy vegetables on half your plate. Add meat or meat substitutes on one-quarter of the plate. Put a grain or starchy vegetable (such as brown rice or a potato) on the final quarter of the plate.  You can add a small piece of fruit and some low-fat or fat-free milk or yogurt, depending on your carbohydrate goal for each meal. 
Here are some tips for using the plate format: · Make sure that you are not using an oversized plate. A 9-inch plate is best. Many restaurants use larger plates. · Get used to using the plate format at home. Then you can use it when you eat out. · Write down your questions about using the plate format. Talk to your doctor, a dietitian, or a diabetes educator about your concerns. Carbohydrate counting With carbohydrate counting, you plan meals based on the amount of carbohydrate in each food. Carbohydrate raises blood sugar higher and more quickly than any other nutrient. It is found in desserts, breads and cereals, and fruit. It's also found in starchy vegetables such as potatoes and corn, grains such as rice and pasta, and milk and yogurt. Spreading carbohydrate throughout the day helps keep your blood sugar levels within your target range. Your daily amount depends on several things, including your weight, how active you are, which diabetes medicines you take, and what your goals are for your blood sugar levels. A registered dietitian or diabetes educator can help you plan how much carbohydrate to include in each meal and snack. A guideline for your daily amount of carbohydrate is: · 45 to 60 grams at each meal. That's about the same as 3 to 4 carbohydrate servings. · 15 to 20 grams at each snack. That's about the same as 1 carbohydrate serving. The Nutrition Facts label on packaged foods tells you how much carbohydrate is in a serving of the food. First, look at the serving size on the food label. Is that the amount you eat in a serving? All of the nutrition information on a food label is based on that serving size. So if you eat more or less than that, you'll need to adjust the other numbers. Total carbohydrate is the next thing you need to look for on the label.  If you count carbohydrate servings, one serving of carbohydrate is 15 grams. For foods that don't come with labels, such as fresh fruits and vegetables, you'll need a guide that lists carbohydrate in these foods. Ask your doctor, dietitian, or diabetes educator about books or other nutrition guides you can use. If you take insulin, you need to know how many grams of carbohydrate are in a meal. This lets you know how much rapid-acting insulin to take before you eat. If you use an insulin pump, you get a constant rate of insulin during the day. So the pump must be programmed at meals to give you extra insulin to cover the rise in blood sugar after meals. When you know how much carbohydrate you will eat, you can take the right amount of insulin. Or, if you always use the same amount of insulin, you need to make sure that you eat the same amount of carbohydrate at meals. If you need more help to understand carbohydrate counting and food labels, ask your doctor, dietitian, or diabetes educator. How do you get started with meal planning? Here are some tips to get started: 
· Plan your meals a week at a time. Don't forget to include snacks too. · Use cookbooks or online recipes to plan several main meals. Plan some quick meals for busy nights. You also can double some recipes that freeze well. Then you can save half for other busy nights when you don't have time to cook. · Make sure you have the ingredients you need for your recipes. If you're running low on basic items, put these items on your shopping list too. · List foods that you use to make breakfasts, lunches, and snacks. List plenty of fruits and vegetables. · Post this list on the refrigerator. Add to it as you think of more things you need. · Take the list to the store to do your weekly shopping. Follow-up care is a key part of your treatment and safety.  Be sure to make and go to all appointments, and call your doctor if you are having problems. It's also a good idea to know your test results and keep a list of the medicines you take. Where can you learn more? Go to http://meka-maegan.info/. Keara Murphy in the search box to learn more about \"Learning About Meal Planning for Diabetes. \" Current as of: December 7, 2017 Content Version: 11.7 © 5769-8578 [employer]. Care instructions adapted under license by [employer] (which disclaims liability or warranty for this information). If you have questions about a medical condition or this instruction, always ask your healthcare professional. [address] any warranty or liability for your use of this information. Please provide this summary of care documentation to your next provider. Your primary care clinician is listed as Srikanth Mcneal. If you have any questions after today's visit, please call [phone].

## 2018-07-12 NOTE — PROGRESS NOTES
Progress Note    Patient: Tali Jackson MRN: <[id number]>  SSN: [id number]    YOB: 1945  Age: 75 y.o. Sex: male        Chief Complaint   Patient presents with    Establish Care         Subjective:     Problems addressed:  Encounter Diagnoses     ICD-10-CM ICD-9-CM   1. Encounter to establish care Z76.89 V65.8   2. Type 2 diabetes mellitus with complication, without long-term current use of insulin (HCC) E11.8 250.90   3. Essential hypertension I10 401.9   4. Mixed hyperlipidemia E78.2 272.2       74 y/o M presents to our clinic to establish care after his PCP retired. Previous records have been requested, but have not been received at this time. He had an appointment for next month, but came in early because he is about to run out of colchicine and wants something different because the colchicine is too expensive for him. He has a PMH of type 2 DM, HTN, and HLD. The patient admits to not taking his Januvia due to the cost of this medication for him which is about $450 per month. He is determined to control his diabetes with diet and exercise and has lost 3 pounds in the last 2 weeks. He states exercise is difficult due to the joint pain he gets in his feet from his gout, but that he will do his best. He is continuing to follow up with his endocrinologist for his T2DM management. He reports he has been spacing out his colchicine dose and only taking it every few days instead of every day to reduce the cost. He asks if there is a good alternative that is cheaper. He reports taking his metformin, glipizide, valsartan, and simvastatin as prescribed, and denies any recent illnesses or hospitlizations.      He has a pacemaker and follows up with his cardiologist.        Current and past medical information:    Current Medications after this visit[de-identified]   Current Outpatient Prescriptions   Medication Sig    ACCU-CHEK SMARTVIEW TEST STRIP strip Testing once a day    glipiZIDE (GLUCOTROL) 10 mg tablet Take 10 mg before breakfast and take 10 mg before dinner.  metFORMIN (GLUCOPHAGE) 1,000 mg tablet Take 1 Tab by mouth two (2) times daily (with meals).  valsartan (DIOVAN) 160 mg tablet Take 1 Tab by mouth two (2) times a day.  colchicine 0.6 mg tablet Take 1 Tab by mouth daily. (Patient taking differently: Take 0.6 mg by mouth as needed.)    simvastatin (ZOCOR) 20 mg tablet Take  by mouth nightly.  cholecalciferol (VITAMIN D3) 1,000 unit cap Take 400 Units by mouth daily.  VIT A/VIT C/VIT E/ZINC/COPPER (PRESERVISION AREDS PO) Take  by mouth.  aspirin delayed-release 81 mg tablet Take 81 mg by mouth daily.  SITagliptin (JANUVIA) 100 mg tablet Take 1 Tab by mouth every morning.  SITagliptin (JANUVIA) 100 mg tablet Take 1 Tab by mouth every morning.  OTHER Take 2 mg by mouth daily. Super Beta Prostate     No current facility-administered medications for this visit. Patient Active Problem List    Diagnosis Date Noted    Type 2 diabetes mellitus with hyperglycemia, without long-term current use of insulin ([facility] 01/21/2017    Mixed hyperlipidemia 01/21/2017    Essential hypertension 01/21/2017       Past Medical History:   Diagnosis Date    Diabetes ([facility]     Gout     HTN (hypertension)     Hyperlipemia     Vitamin D deficiency        Allergies   Allergen Reactions    Lisinopril Swelling       History reviewed. No pertinent surgical history. Social History     Social History    Marital status:      Spouse name: N/A    Number of children: N/A    Years of education: N/A     Social History Main Topics    Smoking status: Former Smoker    Smokeless tobacco: Never Used    Alcohol use Yes    Drug use: None    Sexual activity: Not Asked     Other Topics Concern    None     Social History Narrative         Review of Systems   Constitutional: Positive for weight loss. Negative for chills and fever. HENT: Negative for hearing loss and sore throat.     Eyes: Negative for blurred vision and double vision. Respiratory: Negative for cough and shortness of breath. Cardiovascular: Negative for chest pain, palpitations and leg swelling. Gastrointestinal: Negative for abdominal pain, constipation, diarrhea, nausea and vomiting. Genitourinary: Negative for dysuria and hematuria. Musculoskeletal: Positive for joint pain (feet/ankles). Skin: Negative for rash. Neurological: Negative for dizziness and headaches. Psychiatric/Behavioral: Negative for depression. Objective:     Vitals:    07/12/18 1015   BP: 130/65   Pulse: 65   Resp: 20   Temp: 98.3 °F (36.8 °C)   TempSrc: Oral   SpO2: 98%   Weight: 207 lb (93.9 kg)   Height: 5' 7\" (1.702 m)      Body mass index is 32.42 kg/(m^2). Physical Exam   Constitutional: He is oriented to person, place, and time. He appears well-developed and well-nourished. No distress. HENT:   Head: Normocephalic and atraumatic. Right Ear: External ear normal.   Left Ear: External ear normal.   Mouth/Throat: Oropharynx is clear and moist. No oropharyngeal exudate. Eyes: Pupils are equal, round, and reactive to light. No scleral icterus. Neck: Normal range of motion. No thyromegaly present. Cardiovascular: Normal rate, regular rhythm and normal heart sounds. No murmur heard. Pulmonary/Chest: Effort normal and breath sounds normal. No respiratory distress. He has no wheezes. Abdominal: Soft. Bowel sounds are normal. He exhibits no distension and no mass. There is no tenderness. There is no guarding. Musculoskeletal: Normal range of motion. He exhibits no edema. Lymphadenopathy:     He has no cervical adenopathy. Neurological: He is alert and oriented to person, place, and time. He has normal reflexes. Skin: No rash noted. No erythema. Psychiatric: He has a normal mood and affect.  His behavior is normal. Thought content normal.        Health Maintenance Due   Topic Date Due    EYE EXAM RETINAL OR DILATED Q1  04/03/1954    DTaP/Tdap/Td series (1 - Tdap) 04/03/1965    FOBT Q 1 YEAR AGE 50-75  04/03/1994    ZOSTER VACCINE AGE 60>  02/03/2004    GLAUCOMA SCREENING Q2Y  04/03/2009    Pneumococcal 65+ Low/Medium Risk (1 of 2 - PCV13) 04/03/2009    LIPID PANEL Q1  07/25/2018       Assessment and orders:     Encounter Diagnoses     ICD-10-CM ICD-9-CM   1. Encounter to establish care Z76.89 V65.8   2. Type 2 diabetes mellitus with complication, without long-term current use of insulin (HCC) E11.8 250.90   3. Essential hypertension I10 401.9   4. Mixed hyperlipidemia E78.2 272.2       76 y/o M with an encounter to establish care after his previous PCP retired. 1. Encounter to establish care: Patient's previous medical records requested and will review them once received. Patient counseled on diet and exercise and encouraged to look for a pool for exercise to avoid causing excess joint pain and to allow for increased exercise. Physical exam today was normal. Fecal occult blood test ordered along with CBC, CMP, and a lipid panel for baseline labs and will f/u with results. Patient was not up to date on vaccinations and prescribed today tetanus booster, pneumococcal, and shingles vaccinations. 2. T2DM: Uncontrolled, A1c 8.6% on 6/29. Patient following up with his endocrinologist and will continue metformin 1,000mg BID and glipizide 10mg daily. 3. HLD: Well controlled as of labs 1 year ago in July 2017. Will continue simvastatin 20mg daily and will get a lipid panel and f/u with results. 4. Gout: Patient counseled to continue spacing out his colchicine to reduce the cost.       Plan of care:  Discussed diagnoses in detail with patient. Medication risks/benefits/side effects discussed with patient. All of the patient's questions were addressed. The patient understands and agrees with our plan of care.     The patient knows to call back if they are unsure of or forget any changes we discussed today or if the symptoms change. The patient received an After-Visit Summary which contains VS, orders, medication list and allergy list. This can be used as a \"mini-medical record\" should they have to seek medical care while out of town. Follow-up Disposition:  Return in about 1 month (around 8/12/2018), or if symptoms worsen or fail to improve.     Future Appointments  Date Time Provider Diaz Yelitza   12/4/2018 1:30 PM Jasen Rosado MD CDE [facility] SCHED       Signed By: Nkechi Villa MD     July 12, 2018

## 2018-07-14 NOTE — PROGRESS NOTES
I saw and evaluated the patient with the resident, performing the key elements of the exam and service. I discussed the findings, assessment and plan with the resident and agree with the resident's findings and plan as documented in the resident's note. Gabbie Lockett M.D.

## 2018-07-17 LAB — Lab: NORMAL

## 2018-07-18 LAB
ALBUMIN SERPL-MCNC: 4.3 G/DL (ref 3.5–4.8)
ALBUMIN/GLOB SERPL: 1.8 {RATIO} (ref 1.2–2.2)
ALP SERPL-CCNC: 87 IU/L (ref 39–117)
ALT SERPL-CCNC: 11 IU/L (ref 0–44)
AST SERPL-CCNC: 13 IU/L (ref 0–40)
BILIRUB SERPL-MCNC: 0.3 MG/DL (ref 0–1.2)
BUN SERPL-MCNC: 13 MG/DL (ref 8–27)
BUN/CREAT SERPL: 12 (ref 10–24)
CALCIUM SERPL-MCNC: 9.1 MG/DL (ref 8.6–10.2)
CHLORIDE SERPL-SCNC: 101 MMOL/L (ref 96–106)
CHOLEST SERPL-MCNC: 108 MG/DL (ref 100–199)
CO2 SERPL-SCNC: 19 MMOL/L (ref 20–29)
CREAT SERPL-MCNC: 1.13 MG/DL (ref 0.76–1.27)
ERYTHROCYTE [DISTWIDTH] IN BLOOD BY AUTOMATED COUNT: 15.1 % (ref 12.3–15.4)
GLOBULIN SER CALC-MCNC: 2.4 G/DL (ref 1.5–4.5)
GLUCOSE SERPL-MCNC: 166 MG/DL (ref 65–99)
HCT VFR BLD AUTO: 35.8 % (ref 37.5–51)
HDLC SERPL-MCNC: 49 MG/DL
HGB BLD-MCNC: 11.8 G/DL (ref 13–17.7)
LDLC SERPL CALC-MCNC: 36 MG/DL (ref 0–99)
MCH RBC QN AUTO: 26 PG (ref 26.6–33)
MCHC RBC AUTO-ENTMCNC: 33 G/DL (ref 31.5–35.7)
MCV RBC AUTO: 79 FL (ref 79–97)
PLATELET # BLD AUTO: 194 X10E3/UL (ref 150–379)
POTASSIUM SERPL-SCNC: 4.8 MMOL/L (ref 3.5–5.2)
PROT SERPL-MCNC: 6.7 G/DL (ref 6–8.5)
RBC # BLD AUTO: 4.54 X10E6/UL (ref 4.14–5.8)
SODIUM SERPL-SCNC: 139 MMOL/L (ref 134–144)
TRIGL SERPL-MCNC: 116 MG/DL (ref 0–149)
VLDLC SERPL CALC-MCNC: 23 MG/DL (ref 5–40)
WBC # BLD AUTO: 7.6 X10E3/UL (ref 3.4–10.8)

## 2018-07-18 NOTE — PROGRESS NOTES
Results noted. Hgb low at 11.8, but no treatment indicated at this time. Glucose high 166, pt following up with endocrinology for T2DM treatment. Lipid panel normal. Pt notified of results. No further actions required.      Lindsay Craven MD  PGY1 Family Medicine Resident

## 2018-07-22 LAB — HEMOCCULT STL QL IA: NEGATIVE

## 2018-08-07 ENCOUNTER — DOCUMENTATION ONLY (OUTPATIENT)
Dept: ENDOCRINOLOGY | Age: 73
End: 2018-08-07

## 2018-08-22 ENCOUNTER — OFFICE VISIT (OUTPATIENT)
Dept: FAMILY MEDICINE CLINIC | Age: 73
End: 2018-08-22

## 2018-08-22 VITALS
DIASTOLIC BLOOD PRESSURE: 54 MMHG | HEIGHT: 67 IN | OXYGEN SATURATION: 96 % | RESPIRATION RATE: 20 BRPM | SYSTOLIC BLOOD PRESSURE: 130 MMHG | TEMPERATURE: 98.3 F | WEIGHT: 203 LBS | HEART RATE: 80 BPM | BODY MASS INDEX: 31.86 KG/M2

## 2018-08-22 DIAGNOSIS — E78.2 MIXED HYPERLIPIDEMIA: ICD-10-CM

## 2018-08-22 DIAGNOSIS — E11.65 TYPE 2 DIABETES MELLITUS WITH HYPERGLYCEMIA, WITHOUT LONG-TERM CURRENT USE OF INSULIN (HCC): Primary | ICD-10-CM

## 2018-08-22 DIAGNOSIS — Z91.199 MEDICAL NON-COMPLIANCE: ICD-10-CM

## 2018-08-22 DIAGNOSIS — I10 ESSENTIAL HYPERTENSION: ICD-10-CM

## 2018-08-22 RX ORDER — METOPROLOL SUCCINATE 25 MG/1
TABLET, EXTENDED RELEASE ORAL
COMMUNITY
Start: 2018-08-17

## 2018-08-22 RX ORDER — LOSARTAN POTASSIUM 100 MG/1
TABLET ORAL
COMMUNITY
Start: 2018-07-30 | End: 2018-10-16 | Stop reason: SDUPTHER

## 2018-08-22 RX ORDER — BLOOD SUGAR DIAGNOSTIC
STRIP MISCELLANEOUS
Qty: 100 STRIP | Refills: 1 | Status: SHIPPED | OUTPATIENT
Start: 2018-08-22 | End: 2019-01-07 | Stop reason: SDUPTHER

## 2018-08-22 RX ORDER — SIMVASTATIN 20 MG/1
20 TABLET, FILM COATED ORAL
Qty: 90 TAB | Refills: 1 | Status: SHIPPED | OUTPATIENT
Start: 2018-08-22

## 2018-08-22 RX ORDER — LANCETS
EACH MISCELLANEOUS
Qty: 100 EACH | Refills: 1 | Status: SHIPPED | OUTPATIENT
Start: 2018-08-22 | End: 2019-01-07 | Stop reason: SDUPTHER

## 2018-08-22 NOTE — MR AVS SNAPSHOT
Ke Chaves 
 
 
 2006 [address] [address] 
[phone] Patient: Nena Hays MRN: [id number] TMV:1/2/7999 Visit Information Date & Time Provider Department Dept. Phone Encounter #  
 8/22/2018 11:00 AM Ashley Kim MD  [facility] [place] [id number] Follow-up Instructions Return in about 4 weeks (around 9/19/2018) for Routine with Dr. Kevin Julien. .  
  
Your Appointments 12/4/2018  1:30 PM  
ROUTINE CARE with Rubén Arellano MD  
[facility]) Appt Note: 5 mon fu DM  
 [address] [facility] [address] [phone]  
  
   
 [address] [place] [id number] Upcoming Health Maintenance Date Due DTaP/Tdap/Td series (1 - Tdap) 4/3/1965 ZOSTER VACCINE AGE 60> 2/3/2004 Pneumococcal 65+ Low/Medium Risk (1 of 2 - PCV13) 4/3/2009 EYE EXAM RETINAL OR DILATED Q1 7/12/2018 Influenza Age 5 to Adult 8/1/2018 HEMOGLOBIN A1C Q6M 12/29/2018 FOOT EXAM Q1 1/30/2019 MICROALBUMIN Q1 1/30/2019 GLAUCOMA SCREENING Q2Y 7/12/2019 LIPID PANEL Q1 7/16/2019 FOBT Q 1 YEAR AGE 50-75 7/16/2019 Allergies as of 8/22/2018  Review Complete On: 8/22/2018 By: Lisset Olmedo Severity Noted Reaction Type Reactions Lisinopril  01/20/2017    Swelling Current Immunizations  Never Reviewed No immunizations on file. Not reviewed this visit You Were Diagnosed With   
  
 Codes Comments Type 2 diabetes mellitus with hyperglycemia, without long-term current use of insulin (HCC)    -  Primary ICD-10-CM: E11.65 ICD-9-CM: 250.00, 790.29 Mixed hyperlipidemia     ICD-10-CM: E78.2 ICD-9-CM: 272.2 Essential hypertension     ICD-10-CM: I10 
ICD-9-CM: 401.9 Vitals BP Pulse Temp Resp Height(growth percentile) Weight(growth percentile) 130/54 (BP 1 Location: Left arm, BP Patient Position: Sitting) 80 98.3 °F (36.8 °C) (Oral) 20 5' 7\" (1.702 m) 203 lb (92.1 kg) SpO2 BMI Smoking Status 96% 31.79 kg/m2 Former Smoker Vitals History BMI and BSA Data Body Mass Index Body Surface Area 31.79 kg/m 2 2.09 m 2 Preferred Pharmacy Pharmacy Name Phone [facility] [address] [place] [address] [phone] Your Updated Medication List  
  
   
This list is accurate as of 8/22/18 11:32 AM.  Always use your most recent med list.  
  
  
  
  
 ACCU-CHEK SMARTVIEW TEST STRIP strip Generic drug:  glucose blood VI test strips Testing once a day  
  
 aspirin delayed-release 81 mg tablet Take 81 mg by mouth daily. colchicine 0.6 mg tablet Take 1 Tab by mouth daily. glipiZIDE 10 mg tablet Commonly known as:  Ted Vieyra Take 10 mg before breakfast and take 10 mg before dinner. Lancets Misc Commonly known as:  ACCU-CHEK FASTCLIX LANCET DRUM For blood glucose testing daily. losartan 100 mg tablet Commonly known as:  COZAAR  
  
 metFORMIN 1,000 mg tablet Commonly known as:  GLUCOPHAGE Take 1 Tab by mouth two (2) times daily (with meals). metoprolol succinate 25 mg XL tablet Commonly known as:  TOPROL-XL  
  
 OTHER Take 2 mg by mouth daily. Super Beta Prostate PRESERVISION AREDS PO Take  by mouth. simvastatin 20 mg tablet Commonly known as:  ZOCOR Take 1 Tab by mouth nightly. valsartan 160 mg tablet Commonly known as:  DIOVAN Take 1 Tab by mouth two (2) times a day. VITAMIN D3 1,000 unit Cap Generic drug:  cholecalciferol Take 400 Units by mouth daily. Prescriptions Sent to Pharmacy Refills ACCU-CHEK SMARTVIEW TEST STRIP strip 1 Sig: Testing once a day Class: Normal  
 Pharmacy: [address] [place] [address], [address]  #: [phone] Lancets (ACCU-CHEK FASTCLIX LANCET DRUM) [facility] 1 Sig: For blood glucose testing daily. Class: Normal  
 Pharmacy: [address] [place] [address], [address] [place] [address] Ph #: [phone]  
 simvastatin (ZOCOR) 20 mg tablet 1 Sig: Take 1 Tab by mouth nightly. Class: Normal  
 Pharmacy: [address] [place] [address], [address] [place] [address] Ph #: [phone] Route: Oral  
  
Follow-up Instructions Return in about 4 weeks (around 9/19/2018) for Routine with Dr. Red Levi. .  
  
  
Patient Instructions Learning About Diabetes Food Guidelines Your Care Instructions Meal planning is important to manage diabetes. It helps keep your blood sugar at a target level (which you set with your doctor). You don't have to eat special foods. You can eat what your family eats, including sweets once in a while. But you do have to pay attention to how often you eat and how much you eat of certain foods. You may want to work with a dietitian or a certified diabetes educator (CDE) to help you plan meals and snacks. A dietitian or CDE can also help you lose weight if that is one of your goals. What should you know about eating carbs? Managing the amount of carbohydrate (carbs) you eat is an important part of healthy meals when you have diabetes. Carbohydrate is found in many foods. · Learn which foods have carbs. And learn the amounts of carbs in different foods. ¨ Bread, cereal, pasta, and rice have about 15 grams of carbs in a serving. A serving is 1 slice of bread (1 ounce), ½ cup of cooked cereal, or 1/3 cup of cooked pasta or rice. ¨ Fruits have 15 grams of carbs in a serving. A serving is 1 small fresh fruit, such as an apple or orange; ½ of a banana; ½ cup of cooked or canned fruit; ½ cup of fruit juice; 1 cup of melon or raspberries; or 2 tablespoons of dried fruit. ¨ Milk and no-sugar-added yogurt have 15 grams of carbs in a serving.  A serving is 1 cup of milk or 2/3 cup of no-sugar-added yogurt. ¨ Starchy vegetables have 15 grams of carbs in a serving. A serving is ½ cup of mashed potatoes or sweet potato; 1 cup winter squash; ½ of a small baked potato; ½ cup of cooked beans; or ½ cup cooked corn or green peas. · Learn how much carbs to eat each day and at each meal. A dietitian or CDE can teach you how to keep track of the amount of carbs you eat. This is called carbohydrate counting. · If you are not sure how to count carbohydrate grams, use the Plate Method to plan meals. It is a good, quick way to make sure that you have a balanced meal. It also helps you spread carbs throughout the day. ¨ Divide your plate by types of foods. Put non-starchy vegetables on half the plate, meat or other protein food on one-quarter of the plate, and a grain or starchy vegetable in the final quarter of the plate. To this you can add a small piece of fruit and 1 cup of milk or yogurt, depending on how many carbs you are supposed to eat at a meal. 
· Try to eat about the same amount of carbs at each meal. Do not \"save up\" your daily allowance of carbs to eat at one meal. 
· Proteins have very little or no carbs per serving. Examples of proteins are beef, chicken, turkey, fish, eggs, tofu, cheese, cottage cheese, and peanut butter. A serving size of meat is 3 ounces, which is about the size of a deck of cards. Examples of meat substitute serving sizes (equal to 1 ounce of meat) are 1/4 cup of cottage cheese, 1 egg, 1 tablespoon of peanut butter, and ½ cup of tofu. How can you eat out and still eat healthy? · Learn to estimate the serving sizes of foods that have carbohydrate. If you measure food at home, it will be easier to estimate the amount in a serving of restaurant food. · If the meal you order has too much carbohydrate (such as potatoes, corn, or baked beans), ask to have a low-carbohydrate food instead. Ask for a salad or green vegetables. · If you use insulin, check your blood sugar before and after eating out to help you plan how much to eat in the future. · If you eat more carbohydrate at a meal than you had planned, take a walk or do other exercise. This will help lower your blood sugar. What else should you know? · Limit saturated fat, such as the fat from meat and dairy products. This is a healthy choice because people who have diabetes are at higher risk of heart disease. So choose lean cuts of meat and nonfat or low-fat dairy products. Use olive or canola oil instead of butter or shortening when cooking. · Don't skip meals. Your blood sugar may drop too low if you skip meals and take insulin or certain medicines for diabetes. · Check with your doctor before you drink alcohol. Alcohol can cause your blood sugar to drop too low. Alcohol can also cause a bad reaction if you take certain diabetes medicines. Follow-up care is a key part of your treatment and safety. Be sure to make and go to all appointments, and call your doctor if you are having problems. It's also a good idea to know your test results and keep a list of the medicines you take. Where can you learn more? Go to http://meka-maegan.info/. Enter Q251 in the search box to learn more about \"Learning About Diabetes Food Guidelines. \" Current as of: December 7, 2017 Content Version: 11.7 © 6610-8115 [employer]. Care instructions adapted under license by [employer] (which disclaims liability or warranty for this information). If you have questions about a medical condition or this instruction, always ask your healthcare professional. [place] [address] any warranty or liability for your use of this information. Please provide this summary of care documentation to your next provider. Your primary care clinician is listed as Isabel Tompkins.  If you have any questions after today's visit, please call [phone].

## 2018-08-22 NOTE — PATIENT INSTRUCTIONS

## 2018-08-22 NOTE — PROGRESS NOTES
1. Have you been to the ER, urgent care clinic, or been hospitalized since your last visit? 2. Have you seen or consulted any other health care providers outside of the [address] [place] [address] since your last visit? Include any pap smears or colon screening.     Reviewed record in preparation for visit and have necessary documentation  Pt did not bring medication to office visit for review  Information was given to pt on Advanced Directives, Living Will  Information was given on Shingles Vaccine  opportunity was given for questions  Goals that were addressed and/or need to be completed during or after this appointment include       Health Maintenance Due   Topic Date Due    DTaP/Tdap/Td series (1 - Tdap) 04/03/1965    ZOSTER VACCINE AGE 60>  02/03/2004    Pneumococcal 65+ Low/Medium Risk (1 of 2 - PCV13) 04/03/2009    EYE EXAM RETINAL OR DILATED Q1  07/12/2018    Influenza Age 9 to Adult  08/01/2018

## 2018-08-22 NOTE — PROGRESS NOTES
Kyle Andrea  75 y.o. male  1945  [address]  [id number]     [id number]: Progress Note       Encounter Date: 8/22/2018    No chief complaint on file. History provided by patient  History of Present Illness   Kyle Andrea is a 75 y.o. male who presents to clinic today for:    Diabetes Follow up: Uncontrolled   Overall the patient feels he has been working on changing his diet to improve sugars,    Diabetic Consultants:Endocrinologist - Dr. Hal Lentz agent (monotherapy): metformin (generic)   Medication compliance:Good   Frequency of home glucose testing: Daily   Blood Sugar range at home:  (fasting labs only)   Polyuria, polyphagia or polydipsia: NO   Retinopathy: NO, records requested. Neuropathy SX: NO   Low blood sugar symptoms: No   Dietary compliance: Improved.     On ASA: Yes   Pertinent Labs:      Hemoglobin A1c   Date Value Ref Range Status   07/25/2017 7.6 (H) 4.8 - 5.6 % Final     Comment:              Pre-diabetes: 5.7 - 6.4           Diabetes: >6.4           Glycemic control for adults with diabetes: <7.0     04/19/2017 8.5 (H) 4.8 - 5.6 % Final     Comment:              Pre-diabetes: 5.7 - 6.4           Diabetes: >6.4           Glycemic control for adults with diabetes: <7.0       Estimated average glucose   Date Value Ref Range Status   07/25/2017 171 mg/dL Final   04/19/2017 197 mg/dL Final     Cholesterol, total   Date Value Ref Range Status   07/16/2018 108 100 - 199 mg/dL Final     HDL Cholesterol   Date Value Ref Range Status   07/16/2018 49 >39 mg/dL Final     Hemoglobin A1c, External   Date Value Ref Range Status   11/05/2016 8.3  Final        Wt Readings from Last 3 Encounters:   08/22/18 203 lb (92.1 kg)   07/12/18 207 lb (93.9 kg)   06/29/18 210 lb (95.3 kg)        History   Smoking Status    Former Smoker   Smokeless Tobacco    Never Used              Health Maintenance  Release signed for records. Review of Systems   Review of Systems   Constitutional: Negative for chills and fever. Cardiovascular: Negative for chest pain, palpitations and leg swelling. Gastrointestinal: Negative for abdominal pain, nausea and vomiting. Skin: Negative for itching and rash. Neurological: Negative for dizziness, tingling and headaches. Vitals/Objective:     Vitals:    08/22/18 1049   BP: 130/54   Pulse: 80   Resp: 20   Temp: 98.3 °F (36.8 °C)   TempSrc: Oral   SpO2: 96%   Weight: 203 lb (92.1 kg)   Height: 5' 7\" (1.702 m)     Body mass index is 31.79 kg/(m^2). Wt Readings from Last 3 Encounters:   08/22/18 203 lb (92.1 kg)   07/12/18 207 lb (93.9 kg)   06/29/18 210 lb (95.3 kg)       Physical Exam   Constitutional: He appears well-developed and well-nourished. Cardiovascular: Normal rate and regular rhythm. Pulmonary/Chest: Effort normal and breath sounds normal.   Skin: Skin is warm and dry. No results found for this or any previous visit (from the past 24 hour(s)). Assessment and Plan:     Encounter Diagnoses     ICD-10-CM ICD-9-CM   1. Type 2 diabetes mellitus with hyperglycemia, without long-term current use of insulin (HCC) E11.65 250.00     790.29   2. Mixed hyperlipidemia E78.2 272.2   3. Essential hypertension I10 401.9   4. Medical non-compliance Z91.19 V15.81       1. Type 2 diabetes mellitus with hyperglycemia, without long-term current use of insulin ([facility])  Patient has been improving diet and continues to lose weight. - ACCU-CHEK SMARTVIEW TEST STRIP strip; Testing once a day  Dispense: 100 Strip; Refill: 1  - Lancets (ACCU-CHEK FASTCLIX LANCET DRUM) misc; For blood glucose testing daily. Dispense: 100 Each; Refill: 1  - simvastatin (ZOCOR) 20 mg tablet; Take 1 Tab by mouth nightly. Dispense: 90 Tab; Refill: 1    2. Mixed hyperlipidemia  - simvastatin (ZOCOR) 20 mg tablet; Take 1 Tab by mouth nightly. Dispense: 90 Tab; Refill: 1    3. Essential hypertension  4. Medical non-compliance  BP controlled. Questionable compliance with medication as he reports that cardiologist wrote a new prescription but he has not taken it nor does he know the name of the medication. Release signed for records from cardiologist. External med rec also states patient has metoprolol and losartan but patient is unfamiliar. Patient reports he will bring is unknown medications in later today to be advised if he should it. I have discussed the diagnosis with the patient and the intended plan as seen in the above orders. he has expressed understanding. The patient has received an after-visit summary and questions were answered concerning future plans. I have discussed medication side effects and warnings with the patient as well. Electronically Signed: Gina Schwarz MD     History/Allergies   Patients past medical, surgical and family histories were reviewed and updated. Past Medical History:   Diagnosis Date    Diabetes ([address]     Gout     HTN (hypertension)     Hyperlipemia     Vitamin D deficiency     History reviewed. No pertinent surgical history. No family history on file. Social History     Social History    Marital status:      Spouse name: N/A    Number of children: N/A    Years of education: N/A     Occupational History    Not on file. Social History Main Topics    Smoking status: Former Smoker    Smokeless tobacco: Never Used    Alcohol use Yes    Drug use: Not on file    Sexual activity: Not on file     Other Topics Concern    Not on file     Social History Narrative         Allergies   Allergen Reactions    Lisinopril Swelling       Disposition     Follow-up Disposition:  Return in about 4 weeks (around 9/19/2018) for Routine with Dr. Kg Granger.  .    Future Appointments  Date Time Provider [place]   12/4/2018 1:30 PM Shannan Dodd MD CDE [facility]            Current Medications after this visit     Current Outpatient Prescriptions   Medication Sig    metoprolol succinate (TOPROL-XL) 25 mg XL tablet     losartan (COZAAR) 100 mg tablet     ACCU-CHEK SMARTVIEW TEST STRIP strip Testing once a day    Lancets (ACCU-CHEK FASTCLIX LANCET DRUM) misc For blood glucose testing daily.  simvastatin (ZOCOR) 20 mg tablet Take 1 Tab by mouth nightly.  glipiZIDE (GLUCOTROL) 10 mg tablet Take 10 mg before breakfast and take 10 mg before dinner.  OTHER Take 2 mg by mouth daily. Super Beta Prostate    metFORMIN (GLUCOPHAGE) 1,000 mg tablet Take 1 Tab by mouth two (2) times daily (with meals).  valsartan (DIOVAN) 160 mg tablet Take 1 Tab by mouth two (2) times a day.  colchicine 0.6 mg tablet Take 1 Tab by mouth daily. (Patient taking differently: Take 0.6 mg by mouth as needed.)    cholecalciferol (VITAMIN D3) 1,000 unit cap Take 400 Units by mouth daily.  VIT A/VIT C/VIT E/ZINC/COPPER (PRESERVISION AREDS PO) Take  by mouth.  aspirin delayed-release 81 mg tablet Take 81 mg by mouth daily. No current facility-administered medications for this visit.       Medications Discontinued During This Encounter   Medication Reason    SITagliptin (JANUVIA) 100 mg tablet Cost of Medication    SITagliptin (JANUVIA) 100 mg tablet Cost of Medication    ACCU-CHEK SMARTVIEW TEST STRIP strip Reorder    simvastatin (ZOCOR) 20 mg tablet Reorder

## 2018-10-08 DIAGNOSIS — E11.65 TYPE 2 DIABETES MELLITUS WITH HYPERGLYCEMIA, WITHOUT LONG-TERM CURRENT USE OF INSULIN (HCC): ICD-10-CM

## 2018-10-08 RX ORDER — GLIPIZIDE 10 MG/1
TABLET ORAL
Qty: 180 TAB | Refills: 3 | Status: SHIPPED | OUTPATIENT
Start: 2018-10-08

## 2018-10-16 DIAGNOSIS — E11.65 TYPE 2 DIABETES MELLITUS WITH HYPERGLYCEMIA, WITHOUT LONG-TERM CURRENT USE OF INSULIN (HCC): ICD-10-CM

## 2018-10-16 NOTE — TELEPHONE ENCOUNTER
Pt is requesting refill on \"Metformin\" 1000mg, \"Losartan\" 100mg, \"Glipizide\" 10mg. [facility] pharmacy (on file). Best contact [phone]     Message taken by Call Center.    Last office visit was 8/22/18

## 2018-10-17 NOTE — TELEPHONE ENCOUNTER
Pt called to check status of prescription refills. He states that he completely out of medication now.

## 2018-10-18 RX ORDER — LOSARTAN POTASSIUM 100 MG/1
100 TABLET ORAL DAILY
Qty: 30 TAB | Refills: 3 | Status: SHIPPED | OUTPATIENT
Start: 2018-10-18

## 2018-10-18 RX ORDER — METFORMIN HYDROCHLORIDE 1000 MG/1
1000 TABLET ORAL 2 TIMES DAILY WITH MEALS
Qty: 180 TAB | Refills: 3 | Status: SHIPPED | OUTPATIENT
Start: 2018-10-18

## 2018-10-19 NOTE — TELEPHONE ENCOUNTER
Approved medication refill request of losartan 100mg once daily and metformin 1000mg twice daily.     Jeronimo Nurse, MD   9:52 PM

## 2018-12-12 ENCOUNTER — TELEPHONE (OUTPATIENT)
Dept: ENDOCRINOLOGY | Age: 73
End: 2018-12-12

## 2018-12-12 NOTE — TELEPHONE ENCOUNTER
Patient received the missed appointment letter for Dec. 4th. He apologized thinking he had cancelled through the automated message prompt. His wife who is also your patient; Ophelia Leyva reyna 1943 broke her hip and they are going through many things right now and will reschedule both appointments when he is able to. He wanted you to know.

## 2019-01-07 DIAGNOSIS — E11.65 TYPE 2 DIABETES MELLITUS WITH HYPERGLYCEMIA, WITHOUT LONG-TERM CURRENT USE OF INSULIN (HCC): ICD-10-CM

## 2019-01-07 RX ORDER — BLOOD SUGAR DIAGNOSTIC
STRIP MISCELLANEOUS
Qty: 100 STRIP | Refills: 1 | Status: SHIPPED | OUTPATIENT
Start: 2019-01-07

## 2019-01-07 RX ORDER — LANCETS
EACH MISCELLANEOUS
Qty: 102 EACH | Refills: 1 | Status: SHIPPED | OUTPATIENT
Start: 2019-01-07

## 2019-02-11 DIAGNOSIS — E11.65 TYPE 2 DIABETES MELLITUS WITH HYPERGLYCEMIA, WITHOUT LONG-TERM CURRENT USE OF INSULIN (HCC): ICD-10-CM

## 2019-02-11 DIAGNOSIS — E78.2 MIXED HYPERLIPIDEMIA: ICD-10-CM

## 2019-02-12 RX ORDER — SIMVASTATIN 20 MG/1
TABLET, FILM COATED ORAL
Qty: 90 TAB | Refills: 1 | OUTPATIENT
Start: 2019-02-12

## 2019-02-12 NOTE — TELEPHONE ENCOUNTER
Patient states he is being seen at [facility] now. It was too far to come to [place]. He will call [facility] for request of Medication.

## 2019-10-30 DIAGNOSIS — E11.65 TYPE 2 DIABETES MELLITUS WITH HYPERGLYCEMIA, WITHOUT LONG-TERM CURRENT USE OF INSULIN (HCC): ICD-10-CM

## 2019-10-30 RX ORDER — BLOOD SUGAR DIAGNOSTIC
STRIP MISCELLANEOUS
Qty: 100 STRIP | Refills: 1 | OUTPATIENT
Start: 2019-10-30